# Patient Record
Sex: MALE | Race: WHITE | NOT HISPANIC OR LATINO | Employment: OTHER | URBAN - METROPOLITAN AREA
[De-identification: names, ages, dates, MRNs, and addresses within clinical notes are randomized per-mention and may not be internally consistent; named-entity substitution may affect disease eponyms.]

---

## 2017-01-04 ENCOUNTER — OFFICE VISIT (OUTPATIENT)
Dept: UROLOGY | Facility: CLINIC | Age: 67
End: 2017-01-04
Payer: MEDICARE

## 2017-01-04 VITALS
DIASTOLIC BLOOD PRESSURE: 80 MMHG | HEART RATE: 82 BPM | SYSTOLIC BLOOD PRESSURE: 133 MMHG | RESPIRATION RATE: 14 BRPM | TEMPERATURE: 97.6 F

## 2017-01-04 DIAGNOSIS — C64.2 RENAL CELL CARCINOMA, LEFT (H): Primary | ICD-10-CM

## 2017-01-04 PROCEDURE — 99024 POSTOP FOLLOW-UP VISIT: CPT | Performed by: UROLOGY

## 2017-01-04 NOTE — PROGRESS NOTES
REASON FOR VISIT TODAY:  Kidney cancer.      BRIEF COURSE:  Mr. Barrera Urena is a 66-year-old gentleman followed in our clinic for a large sided left renal mass.  The patient underwent an open radical nephrectomy on 12/19/2016.  The patient comes in today in followup.  Mr. Urena notes that all in all he is doing well while at home.  He has been trying to remain active despite the weather.  He notes that his bowels are working and he is having regular bowel movements and passing gas, though he notes his appetite is not back to normal.  The patient is doing nicotine patches in an attempt to quit smoking and has not smoked since surgery.      PHYSICAL EXAMINATION:  On exam today, his blood pressure is 133/80, pulse is 82.  He is in no acute distress.      The patient's subcostal wound is well healing with staples intact.  There is no discharge or erythema noted around the wound.  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      ASSESSMENT AND PLAN:  I suggested to Mr. Urena that we are pleased to see that all his margins were negative, but he did in fact have T3a disease so it is good we got the disease out when we did.  The patient does not require any further therapy at this time.  We would just put on a surveillance program.  We will plan on seeing the patient back in about 3-4 months for his first round of surveillance which would include a CMP and a CT of the chest and abdomen.  We will then determine how to move forward with surveillance at that time in terms of frequency.      Otherwise, the patient was counseled to protect his remaining kidney both in terms of minimizing risk of trauma as well as keeping his blood pressure under control as well as blood sugars and also to avoid nonsteroidal anti-inflammatory or other renal toxic  drugs.  He was also encouraged to continue with smoking cessation.  Mr. Stewart is in agreement with the plan.  We will see the patient back at the end of 2017 or early 2017 with his surveillance labs and imaging.         JOHN STREETER MD             D: 2017 10:52   T: 2017 12:58   MT:       Name:     ANETTE STEWART   MRN:      [id number]        Account:      [id number]   :      1950           Visit Date:   2017      Document: [id number]

## 2017-01-04 NOTE — NURSING NOTE
"Chief Complaint   Patient presents with     Surgical Followup     remove staples       Initial /80 mmHg  Pulse 82  Resp 14 Estimated body mass index is 25.68 kg/(m^2) as calculated from the following:    Height as of 12/19/16: 1.778 m (5' 10\").    Weight as of 12/13/16: 81.194 kg (179 lb).  BP completed using cuff size: regular    Alyssa Manrique MA      "

## 2017-01-23 ENCOUNTER — TELEPHONE (OUTPATIENT)
Dept: UROLOGY | Facility: CLINIC | Age: 67
End: 2017-01-23

## 2017-01-23 NOTE — TELEPHONE ENCOUNTER
Spoke to pt and he stated that since he had the surgery he has many days where he has no appetite and feels full and bloated. He stated that he has many days where he burps all day.  Offered pt appointment and appt was made for Wednesday.    Joaquina RITTER RN BSN PHN  [facility]

## 2017-01-23 NOTE — TELEPHONE ENCOUNTER
"Reason for call:  Patient reporting a symptom    Symptom or request: Pt calling -Dr. Downs's pt.  Kidney removed - states it was cancerous- wants to know when he might start feeling better.  Someday's he doesn't eat all day - no appetite.  Wondering if he should come in?    Duration (how long have symptoms been present): a month or so    Have you been treated for this before? No    Additional comments: \"there is days I don't care that I eat\"  Has lost 15 pounds since the surgery.    Phone Number patient can be reached at:  Home number on file [phone] (home)    Best Time:  any    Can we leave a detailed message on this number:  YES    Call taken on 1/23/2017 at 12:42 PM by Mandi Ewing    "

## 2017-01-25 ENCOUNTER — OFFICE VISIT (OUTPATIENT)
Dept: UROLOGY | Facility: CLINIC | Age: 67
End: 2017-01-25
Payer: MEDICARE

## 2017-01-25 VITALS — HEART RATE: 79 BPM | DIASTOLIC BLOOD PRESSURE: 82 MMHG | SYSTOLIC BLOOD PRESSURE: 123 MMHG | RESPIRATION RATE: 14 BRPM

## 2017-01-25 DIAGNOSIS — C64.2 KIDNEY MALIGNANCY, LEFT (H): Primary | ICD-10-CM

## 2017-01-25 PROCEDURE — 99024 POSTOP FOLLOW-UP VISIT: CPT | Performed by: UROLOGY

## 2017-01-25 NOTE — NURSING NOTE
"Chief Complaint   Patient presents with     RECHECK     since surgery on 12/19/16       Initial /82 mmHg  Pulse 79  Resp 14 Estimated body mass index is 25.68 kg/(m^2) as calculated from the following:    Height as of 12/19/16: 5' 10\" (1.778 m).    Weight as of 12/13/16: 179 lb (81.194 kg).  BP completed using cuff size: regular    Alyssa Manrique MA      "

## 2017-01-26 NOTE — PROGRESS NOTES
REASON FOR VISIT TODAY:  Kidney cancer.      BRIEF COURSE:  Mr. Barrera Urena is a 66-year-old gentleman followed in our clinic for a large left-sided renal mass.  He underwent an open radical nephrectomy on 12/19/16 that showed grade 3 clear cell renal cell carcinoma, stage pT3a N0 MX disease with negative margins.  The patient in general has been doing well but he comes in today noting that he continues to have some decreased appetite at home and some abdominal bloating.  He notes that since surgery he has lost 15 pounds again largely due to decreased appetite.  The patient notes that he continues to pass gas and have regular bowel movements but does note significant burping as well at times.  He notes that he occasionally gets sharp pains near his incision site but they only last for a second or two.      PHYSICAL EXAMINATION:   VITAL SIGNS:  The patient's blood pressure is 120/82, pulse 79, is in no acute distress.      ASSESSMENT AND PLAN:  Over half of today's 45-minute visit was spent counseling the patient regarding his recovery from his kidney cancer.  I counseled Mr. Urena that it is true that he seems to be coming along a little bit slower that we might otherwise expect.  The good news is that he does not sound like he has a mechanical bowel obstruction or anything of that nature given the fact he has continued to have bowel movements and passing gas.  We did discuss that it can take several weeks, even up to perhaps 3 months to completely recover from a big surgery like this.  We also note that we applaud the patient's efforts to stop smoking, which he is currently engaged in.  He is using nicotine patches for this purpose.  I did  the patient that I thought that his situation may be somewhat compounded by the fact that he is attempting to stop smoking at this time as well as this can also cause various changes to his body in addition to the healing process.  However, we do again encourage  the patient to continue to stop smoking.      With regard to the patient's weight loss.  I did suggest that I am concerned about this.  The patient has a big wound to heal and he needs plenty of nutrition in order to heal his wounds, but it is clear the patient is not getting adequate nutrition given his weight loss.  I therefore suggested the patient start consuming some sort of nutritional supplement such as a Boost or Ensure to try to drink 2-3 cans of this a day in addition to his meals.  In addition the patient was counseled to eat small amounts frequently through the course of the day instead of trying to eat larger meals just a couple of times per day.  I have asked the patient to call us back in 2 weeks to see how he is doing at that time.  We certainly hope the patient will start to do a little bit better at home.  He has started to walk a little bit more than he had been before so hopefully increase in activity will also help.  Mr. Stewart is in agreement with the plan.  We look forward to hearing back from him in 2 weeks.         JOHN STREETER MD             D: 2017 17:35   T: 2017 06:24   MT: [id number]      Name:     ANETTE STEWART   MRN:      [id number]        Account:      [id number]   :      1950           Visit Date:   2017      Document: [id number]

## 2017-02-08 ENCOUNTER — TELEPHONE (OUTPATIENT)
Dept: UROLOGY | Facility: CLINIC | Age: 67
End: 2017-02-08

## 2017-03-10 ENCOUNTER — DOCUMENTATION ONLY (OUTPATIENT)
Dept: OTHER | Facility: CLINIC | Age: 67
End: 2017-03-10

## 2017-03-10 DIAGNOSIS — Z71.89 ACP (ADVANCE CARE PLANNING): Chronic | ICD-10-CM

## 2017-04-18 RX ORDER — IOPAMIDOL 755 MG/ML
89 INJECTION, SOLUTION INTRAVASCULAR ONCE
Status: COMPLETED | OUTPATIENT
Start: 2017-04-19 | End: 2017-04-19

## 2017-04-19 ENCOUNTER — HOSPITAL ENCOUNTER (OUTPATIENT)
Dept: CT IMAGING | Facility: CLINIC | Age: 67
Discharge: HOME | End: 2017-04-19
Attending: UROLOGY | Admitting: UROLOGY
Payer: MEDICARE

## 2017-04-19 DIAGNOSIS — C64.2 RENAL CELL CARCINOMA, LEFT (H): ICD-10-CM

## 2017-04-19 LAB
ALBUMIN SERPL-MCNC: 3.9 G/DL (ref 3.4–5)
ALP SERPL-CCNC: 62 U/L (ref 40–150)
ALT SERPL W P-5'-P-CCNC: 23 U/L (ref 0–70)
ANION GAP SERPL CALCULATED.3IONS-SCNC: 7 MMOL/L (ref 3–14)
AST SERPL W P-5'-P-CCNC: 10 U/L (ref 0–45)
BILIRUB SERPL-MCNC: 0.6 MG/DL (ref 0.2–1.3)
BUN SERPL-MCNC: 25 MG/DL (ref 7–30)
CALCIUM SERPL-MCNC: 9.8 MG/DL (ref 8.5–10.1)
CHLORIDE SERPL-SCNC: 102 MMOL/L (ref 94–109)
CO2 SERPL-SCNC: 30 MMOL/L (ref 20–32)
CREAT SERPL-MCNC: 1.37 MG/DL (ref 0.66–1.25)
GFR SERPL CREATININE-BSD FRML MDRD: 52 ML/MIN/1.7M2
GLUCOSE SERPL-MCNC: 111 MG/DL (ref 70–99)
POTASSIUM SERPL-SCNC: 4.6 MMOL/L (ref 3.4–5.3)
PROT SERPL-MCNC: 7.7 G/DL (ref 6.8–8.8)
SODIUM SERPL-SCNC: 139 MMOL/L (ref 133–144)

## 2017-04-19 PROCEDURE — 74160 CT ABDOMEN W/CONTRAST: CPT

## 2017-04-19 PROCEDURE — 25500064 ZZH RX 255 OP 636: Performed by: RADIOLOGY

## 2017-04-19 PROCEDURE — 80053 COMPREHEN METABOLIC PANEL: CPT | Performed by: UROLOGY

## 2017-04-19 PROCEDURE — 36415 COLL VENOUS BLD VENIPUNCTURE: CPT | Performed by: UROLOGY

## 2017-04-19 PROCEDURE — 25000125 ZZHC RX 250: Performed by: RADIOLOGY

## 2017-04-19 RX ADMIN — SODIUM CHLORIDE 62 ML: 9 INJECTION, SOLUTION INTRAVENOUS at 10:14

## 2017-04-19 RX ADMIN — IOPAMIDOL 89 ML: 755 INJECTION, SOLUTION INTRAVENOUS at 10:14

## 2017-04-25 ENCOUNTER — TELEPHONE (OUTPATIENT)
Dept: UROLOGY | Facility: CLINIC | Age: 67
End: 2017-04-25

## 2017-04-25 NOTE — TELEPHONE ENCOUNTER
I spoke to Barrera today. He is a patient of Dr Downs who is S/P Nephrectomy on 16. He says that he is doing well.  He was having problems with weight loss. He says that this is no longer a problem and he has gained a lot of weight. He was scheduled for his visit with Dr Downs for 17 but he had to cancel due to his fathers . He would like us to review results with Dr Downs and see if he could or should be moved up in his schedule from 17 at 4:00 pm. Raine SHAVER RN

## 2017-04-25 NOTE — TELEPHONE ENCOUNTER
Reason for Call:  Other appointment    Detailed comments: pt calling stating he had to cancel his appt for tomorrow  due to a . He is now r/s for . Would like to know if he can be seen sooner     Phone Number Patient can be reached at: Home number on file [phone] (home)    Best Time: any    Can we leave a detailed message on this number? YES    Call taken on 2017 at 10:09 AM by Fiona Rooney

## 2017-05-24 ENCOUNTER — OFFICE VISIT (OUTPATIENT)
Dept: UROLOGY | Facility: CLINIC | Age: 67
End: 2017-05-24
Payer: MEDICARE

## 2017-05-24 VITALS
WEIGHT: 185 LBS | DIASTOLIC BLOOD PRESSURE: 75 MMHG | HEART RATE: 76 BPM | RESPIRATION RATE: 16 BRPM | BODY MASS INDEX: 26.48 KG/M2 | SYSTOLIC BLOOD PRESSURE: 129 MMHG | HEIGHT: 70 IN

## 2017-05-24 DIAGNOSIS — Z85.528 HISTORY OF KIDNEY CANCER: Primary | ICD-10-CM

## 2017-05-24 PROCEDURE — 99213 OFFICE O/P EST LOW 20 MIN: CPT | Performed by: UROLOGY

## 2017-05-24 NOTE — MR AVS SNAPSHOT
After Visit Summary   5/24/2017    Barrera Urena    MRN: [id number]           Patient Information     Date Of Birth          1950        Visit Information        Provider Department      5/24/2017 4:00 PM Tushar Downs MD [facility]        Today's Diagnoses     History of kidney cancer    -  1      Care Instructions    Per Physician's instructions            Follow-ups after your visit        Your next 10 appointments already scheduled     Jun 07, 2017  1:30 PM CDT   [facility]   [facility]    [address] [address]   [phone]            Nov 29, 2017 11:00 AM CST   Return Visit with Tushar Downs MD   [facility]    [address] [address]   [phone]              Future tests that were ordered for you today     Open Future Orders        Priority Expected Expires Ordered    CT Chest/Abdomen/Pelvis w Contrast Routine 11/24/2017 5/24/2018 5/24/2017    Comprehensive metabolic panel Routine 11/24/2017 5/24/2018 5/24/2017            Who to contact     If you have questions or need follow up information about today's clinic visit or your schedule please contact [facility] directly at [phone].  Normal or non-critical lab and imaging results will be communicated to you by MyChart, letter or phone within 4 business days after the clinic has received the results. If you do not hear from us within 7 days, please contact the clinic through MyChart or phone. If you have a critical or abnormal lab result, we will notify you by phone as soon as possible.  Submit refill requests through [employer] or call your pharmacy and they will forward the refill request to us. Please allow 3 business days for your refill to be completed.          Additional Information About Your Visit       "  [facility] Information     [employer] gives you secure access to your electronic health record. If you see a primary care provider, you can also send messages to your care team and make appointments. If you have questions, please call your primary care clinic.  If you do not have a primary care provider, please call [phone] and they will assist you.        Care EveryWhere ID     This is your Care EveryWhere ID. This could be used by other organizations to access your [place] medical records  [id number]        Your Vitals Were     Pulse Respirations Height BMI (Body Mass Index)          76 16 1.778 m (5' 10\") 26.54 kg/m2         Blood Pressure from Last 3 Encounters:   05/24/17 129/75   01/25/17 123/82   01/04/17 133/80    Weight from Last 3 Encounters:   05/24/17 83.9 kg (185 lb)   12/20/16 82.3 kg (181 lb 8 oz)   12/13/16 81.2 kg (179 lb)               Primary Care Provider Office Phone # Fax #    Guillermo Hicks [phone]       [facility] [address]  [facility] [address]        Thank you!     Thank you for choosing [facility]  for your care. Our goal is always to provide you with excellent care. Hearing back from our patients is one way we can continue to improve our services. Please take a few minutes to complete the written survey that you may receive in the mail after your visit with us. Thank you!             Your Updated Medication List - Protect others around you: Learn how to safely use, store and throw away your medicines at www.disposemymeds.org.          This list is accurate as of: 5/24/17  6:20 PM.  Always use your most recent med list.                   Brand Name Dispense Instructions for use    * TYLENOL 325 MG tablet   Generic drug:  acetaminophen      Take 325 mg by mouth as needed       * acetaminophen 325 MG tablet    TYLENOL    100 tablet    Take 2 tablets (650 mg) by mouth every 6 hours as needed for other (surgical pain)       * Notice:  This list has 2 " medication(s) that are the same as other medications prescribed for you. Read the directions carefully, and ask your doctor or other care provider to review them with you.

## 2017-05-24 NOTE — NURSING NOTE
"Chief Complaint   Patient presents with     RECHECK     Kidney malignancy        Initial /75 (BP Location: Left arm, Patient Position: Chair, Cuff Size: Adult Regular)  Pulse 76  Resp 16  Ht 1.778 m (5' 10\")  Wt 83.9 kg (185 lb)  BMI 26.54 kg/m2 Estimated body mass index is 26.54 kg/(m^2) as calculated from the following:    Height as of this encounter: 1.778 m (5' 10\").    Weight as of this encounter: 83.9 kg (185 lb).  BP completed using cuff size: regular      Clare Pemberton CMA     "

## 2017-05-25 NOTE — PROGRESS NOTES
REASON FOR VISIT TODAY:  History of kidney cancer.      BRIEF COURSE:  Mr. Barrera Stewart is a 67-year-old gentleman followed in our clinic for history of a large left-sided renal mass.  He underwent an open radical nephrectomy on 16 that showed Gill grade 3 clear cell renal cell carcinoma, pathologic stage pT3a N0 MX disease with negative margins.  He has done well following surgery, though he did have a 15-pound weight loss shortly after surgery, but we asked the patient to start taking Boost and Ensure after that which he did and he notes that he has not regained the 15 pounds that he has lost, he otherwise feels well.      PHYSICAL EXAMINATION:   VITAL:  On exam his blood pressure is 129/75, pulse 76.   GENERAL:  He is in no acute distress.      DATA:  The patient has a CMP from 2017 showing a creatinine of 1.37 and normal LFTs.  He has a CT of the chest, abdomen and pelvis from 2017 showing a 4 mm indeterminate nodule in the right upper lobe of his lung without any other evidence of recurrence.      ASSESSMENT AND PLAN:  Over half of today's 15-minute visit was spent counseling the patient regarding his history of kidney cancer.  I suggested to Mr. Stewart that the small indeterminate nodule in his lung will need to be followed, but at this point is not overly concerning as we commonly see indeterminate nodules show up on CT scans of the chest.  We will plan on seeing the patient back in 6 months with another complete metabolic panel as well as a repeat CT of the chest, abdomen and pelvis for further followup of both lung nodule and cystoscopy surveillance for his kidney cancer.  The patient is otherwise in agreement with the plan and we will see him back in 6 months' time.         JOHN STREETER MD             D: 2017 18:19   T: 2017 06:51   MT: [id number]      Name:     BARRERA STEWART   MRN:      [id number]        Account:      [id number]   :      1950            Visit Date:   05/24/2017      Document: [id number]

## 2017-10-27 RX ORDER — IOPAMIDOL 755 MG/ML
90 INJECTION, SOLUTION INTRAVASCULAR ONCE
Status: COMPLETED | OUTPATIENT
Start: 2017-10-30 | End: 2017-10-30

## 2017-10-30 ENCOUNTER — HOSPITAL ENCOUNTER (OUTPATIENT)
Dept: CT IMAGING | Facility: CLINIC | Age: 67
Discharge: HOME | End: 2017-10-30
Attending: UROLOGY | Admitting: UROLOGY
Payer: MEDICARE

## 2017-10-30 DIAGNOSIS — Z85.528 HISTORY OF KIDNEY CANCER: ICD-10-CM

## 2017-10-30 LAB
ALBUMIN SERPL-MCNC: 3.9 G/DL (ref 3.4–5)
ALP SERPL-CCNC: 53 U/L (ref 40–150)
ALT SERPL W P-5'-P-CCNC: 21 U/L (ref 0–70)
ANION GAP SERPL CALCULATED.3IONS-SCNC: 4 MMOL/L (ref 3–14)
AST SERPL W P-5'-P-CCNC: 13 U/L (ref 0–45)
BILIRUB SERPL-MCNC: 0.6 MG/DL (ref 0.2–1.3)
BUN SERPL-MCNC: 20 MG/DL (ref 7–30)
CALCIUM SERPL-MCNC: 9.3 MG/DL (ref 8.5–10.1)
CHLORIDE SERPL-SCNC: 103 MMOL/L (ref 94–109)
CO2 SERPL-SCNC: 29 MMOL/L (ref 20–32)
CREAT SERPL-MCNC: 1.31 MG/DL (ref 0.66–1.25)
GFR SERPL CREATININE-BSD FRML MDRD: 54 ML/MIN/1.7M2
GLUCOSE SERPL-MCNC: 110 MG/DL (ref 70–99)
POTASSIUM SERPL-SCNC: 4.8 MMOL/L (ref 3.4–5.3)
PROT SERPL-MCNC: 7.8 G/DL (ref 6.8–8.8)
SODIUM SERPL-SCNC: 136 MMOL/L (ref 133–144)

## 2017-10-30 PROCEDURE — 36415 COLL VENOUS BLD VENIPUNCTURE: CPT | Performed by: UROLOGY

## 2017-10-30 PROCEDURE — 71260 CT THORAX DX C+: CPT

## 2017-10-30 PROCEDURE — 25000128 H RX IP 250 OP 636: Performed by: RADIOLOGY

## 2017-10-30 PROCEDURE — 80053 COMPREHEN METABOLIC PANEL: CPT | Performed by: UROLOGY

## 2017-10-30 PROCEDURE — 74177 CT ABD & PELVIS W/CONTRAST: CPT

## 2017-10-30 PROCEDURE — 25000125 ZZHC RX 250: Performed by: RADIOLOGY

## 2017-10-30 RX ADMIN — IOPAMIDOL 90 ML: 755 INJECTION, SOLUTION INTRAVENOUS at 10:58

## 2017-10-30 RX ADMIN — SODIUM CHLORIDE 62 ML: 9 INJECTION, SOLUTION INTRAVENOUS at 10:58

## 2017-11-02 ENCOUNTER — TELEPHONE (OUTPATIENT)
Dept: UROLOGY | Facility: CLINIC | Age: 67
End: 2017-11-02

## 2017-11-02 NOTE — TELEPHONE ENCOUNTER
Spoke to Asha and advised that these concerns can be addressed at [facility] appt that is schedule with Dr. Downs on 11-29-17.  She agrees with this plan.    Kianna Romano  [facility] RN

## 2017-11-02 NOTE — TELEPHONE ENCOUNTER
Reason for Call:  Other     Detailed comments: Wife, Asha, calling (consent to communicate on file): Pt is bothered by hives since May. He has seen an allergist through [facility] - lab work was supposed to be faxed here from them yesterday. Vitamin D level low - Bottle states not to take vitamin D if pt has kidney disease - They want to know if he is okay to take the Vitamin D with kidney disease and if so, what dose. Allergist believes the hives are related to low vitamin D levels. - Please advise    Phone Number Patient can be reached at: Home number on file [phone] (home)    Best Time: Any    Can we leave a detailed message on this number? YES    Call taken on 11/2/2017 at 9:27 AM by Denise Behrendt

## 2017-11-20 ENCOUNTER — TRANSFERRED RECORDS (OUTPATIENT)
Dept: HEALTH INFORMATION MANAGEMENT | Facility: CLINIC | Age: 67
End: 2017-11-20

## 2017-11-29 ENCOUNTER — OFFICE VISIT (OUTPATIENT)
Dept: UROLOGY | Facility: CLINIC | Age: 67
End: 2017-11-29
Payer: MEDICARE

## 2017-11-29 VITALS — SYSTOLIC BLOOD PRESSURE: 136 MMHG | HEART RATE: 70 BPM | DIASTOLIC BLOOD PRESSURE: 89 MMHG | RESPIRATION RATE: 16 BRPM

## 2017-11-29 DIAGNOSIS — C64.2 MALIGNANT NEOPLASM OF KIDNEY EXCLUDING RENAL PELVIS, LEFT (H): Primary | ICD-10-CM

## 2017-11-29 PROCEDURE — 99213 OFFICE O/P EST LOW 20 MIN: CPT | Performed by: UROLOGY

## 2017-11-29 NOTE — MR AVS SNAPSHOT
After Visit Summary   11/29/2017    Barrera Urena    MRN: [id number]           Patient Information     Date Of Birth          1950        Visit Information        Provider Department      11/29/2017 11:00 AM Tushar Downs MD [facility]        Today's Diagnoses     Malignant neoplasm of kidney excluding renal pelvis, left (H)    -  1       Follow-ups after your visit        Your next 10 appointments already scheduled     May 14, 2018 10:05 AM CDT   LAB with [facility] ([facility])    [address] [address]   [phone]           Please do not eat 10-12 hours before your appointment if you are coming in fasting for labs on lipids, cholesterol, or glucose (sugar). This does not apply to pregnant women. Water, hot tea and black coffee (with nothing added) are okay. Do not drink other fluids, diet soda or chew gum.            May 23, 2018 11:00 AM CDT   Return Visit with Tushar Downs MD   [facility]    [address] [address]   [phone]              Future tests that were ordered for you today     Open Future Orders        Priority Expected Expires Ordered    Comprehensive metabolic panel Routine 5/29/2018 11/29/2018 11/29/2017    CT Chest/Abdomen/Pelvis w Contrast Routine 5/29/2018 11/29/2018 11/29/2017            Who to contact     If you have questions or need follow up information about today's clinic visit or your schedule please contact [facility] directly at [phone].  Normal or non-critical lab and imaging results will be communicated to you by MyChart, letter or phone within 4 business days after the clinic has received the results. If you do not hear from us within 7 days, please contact the clinic through MyChart or phone. If you have a critical or abnormal lab result, we will notify you by phone as  soon as possible.  Submit refill requests through [employer] or call your pharmacy and they will forward the refill request to us. Please allow 3 business days for your refill to be completed.          Additional Information About Your Visit        Concurrent Inchart Information     [employer] gives you secure access to your electronic health record. If you see a primary care provider, you can also send messages to your care team and make appointments. If you have questions, please call your primary care clinic.  If you do not have a primary care provider, please call [phone] and they will assist you.        Care EveryWhere ID     This is your Care EveryWhere ID. This could be used by other organizations to access your [place] medical records  [id number]        Your Vitals Were     Pulse Respirations                70 16           Blood Pressure from Last 3 Encounters:   11/29/17 136/89   05/24/17 129/75   01/25/17 123/82    Weight from Last 3 Encounters:   05/24/17 83.9 kg (185 lb)   12/20/16 82.3 kg (181 lb 8 oz)   12/13/16 81.2 kg (179 lb)               Primary Care Provider Office Phone # Fax #    Guillermo Hicks [phone]       [facility] [place] [address]  [place] [address]        Equal Access to Services     BASIL SIU : Hadii aad ku hadasho Soomaali, waaxda luqadaha, qaybta kaalmada adeegyada, meenu jennings haygaryn deedee fernandez. So [facility] [phone].    ATENCIÓN: Si habla español, tiene a musa disposición servicios gratuitos de asistencia lingüística. Llame al [phone].    We comply with applicable federal civil rights laws and [place] laws. We do not discriminate on the basis of race, color, national origin, age, disability, sex, sexual orientation, or gender identity.            Thank you!     Thank you for choosing [facility]  for your care. Our goal is always to provide you with excellent care. Hearing back from our patients is one way we can continue to improve our  services. Please take a few minutes to complete the written survey that you may receive in the mail after your visit with us. Thank you!             Your Updated Medication List - Protect others around you: Learn how to safely use, store and throw away your medicines at www.disposemymeds.org.          This list is accurate as of: 11/29/17 12:44 PM.  Always use your most recent med list.                   Brand Name Dispense Instructions for use Diagnosis    * TYLENOL 325 MG tablet   Generic drug:  acetaminophen      Take 325 mg by mouth as needed        * acetaminophen 325 MG tablet    TYLENOL    100 tablet    Take 2 tablets (650 mg) by mouth every 6 hours as needed for other (surgical pain)    Renal mass, left       * Notice:  This list has 2 medication(s) that are the same as other medications prescribed for you. Read the directions carefully, and ask your doctor or other care provider to review them with you.

## 2017-11-29 NOTE — NURSING NOTE
"Chief Complaint   Patient presents with     RECHECK       Initial /89 (BP Location: Left arm, Patient Position: Chair, Cuff Size: Adult Regular)  Pulse 70  Resp 16 Estimated body mass index is 26.54 kg/(m^2) as calculated from the following:    Height as of 5/24/17: 1.778 m (5' 10\").    Weight as of 5/24/17: 83.9 kg (185 lb).  Medication Reconciliation: complete.  marisa soares LPN      "

## 2017-11-30 NOTE — PROGRESS NOTES
REASON FOR VISIT TODAY:  History of kidney cancer.      BRIEF COURSE:  Mr. Barrera Urena is a 67-year-old gentleman followed in our clinic for a history of left-sided renal cell carcinoma.  The patient underwent an open radical nephrectomy on 12/19/2016 that showed Gill grade III clear cell renal cell carcinoma with a pathologic stage of [id number] disease with negative margins.  He has had no evidence of recurrence to date.  The patient did have one 4 mm nonspecific pulmonary nodule which is being followed.  Also, following surgery the patient had a 15-pound weight loss due to decreased appetite and has been taking Boost shakes at least 1 a day on average.  He notes that his weight has gone up and in fact he now weighs more than he did preoperatively.  Also of note, the patient has stopped smoking.      PHYSICAL EXAMINATION:   VITAL SIGNS:  On exam today, his blood pressure is 136/89, pulse is 70.   GENERAL:  He is in no acute distress.      The patient's complete metabolic panel from 10/30/2017 shows a creatinine of 1.3, a glucose mildly elevated at 110, and normal LFTs.  He has a CT of the chest, abdomen and pelvis from 10/30/2017 that showed stability of the 4 mm noncalcified nodule in the right upper lobe of his lung that is stable compared to previous imaging.  There is no other evidence of metastatic disease.      ASSESSMENT AND PLAN:  Over half of today's 15-minute visit was spent counseling the patient regarding his kidney cancer.  I suggested to Mr. Urena that we were pleased to see that there is no evidence of metastatic disease and that in particular the lung nodule is stable.  We will simply plan on seeing the patient back in another 6 months with another CMP and another CT of the chest, abdomen and pelvis.  I did  the patient that now that his weight has now superseded his preoperative weight, he can probably back off on the Boost and just eat normally at this point.  Further, we  congratulated the patient on stopping his smoking.  Lastly, the patient's glucose was mildly elevated on a couple of measurements since his surgery, and we have suggested the patient follow up with his primary doctor for further discussions of this.         JOHN STREETER MD             D: 2017 12:44   T: 2017 19:07   MT: [place]      Name:     ANETTE STEWART   MRN:      [id number]        Account:      [id number]   :      1950           Visit Date:   2017      Document: [id number]

## 2017-12-01 ENCOUNTER — MEDICAL CORRESPONDENCE (OUTPATIENT)
Dept: HEALTH INFORMATION MANAGEMENT | Facility: CLINIC | Age: 67
End: 2017-12-01

## 2017-12-19 ENCOUNTER — TELEPHONE (OUTPATIENT)
Dept: ALLERGY | Facility: CLINIC | Age: 67
End: 2017-12-19

## 2017-12-19 NOTE — TELEPHONE ENCOUNTER
Reason for Call:  Other     Detailed comments: [facility] calling to make sure orders for Xolair received. Pt would like to get appt set up to start shots.     Phone Number Patient can be reached at: Please call Steven at [facility] [phone]    Best Time: Any    Can we leave a detailed message on this number? YES    Call taken on 12/19/2017 at 8:59 AM by Denise Behrendt

## 2017-12-19 NOTE — TELEPHONE ENCOUNTER
Spoke to Steven at [facility] to let her know as of Dec 1, 2017 [facility] will no longer be providing Xolair injections to outside patients. Steven was given [facility] number of [phone] and will contact them.    Deya Randall RN

## 2018-01-02 NOTE — TELEPHONE ENCOUNTER
Pt calling regarding Xolair injections. Informed pt of of note below and gave him contact information for [facility] to discuss his options. Informed him that we do not give Xolair injections for outside allergist. Agreeable.    Irasema DENTON RN

## 2018-01-16 PROBLEM — L50.1 IDIOPATHIC URTICARIA: Status: ACTIVE | Noted: 2018-01-16

## 2018-01-24 ENCOUNTER — INFUSION THERAPY VISIT (OUTPATIENT)
Dept: INFUSION THERAPY | Facility: CLINIC | Age: 68
End: 2018-01-24
Attending: ALLERGY & IMMUNOLOGY
Payer: MEDICARE

## 2018-01-24 VITALS — TEMPERATURE: 96.5 F | DIASTOLIC BLOOD PRESSURE: 83 MMHG | HEART RATE: 72 BPM | SYSTOLIC BLOOD PRESSURE: 138 MMHG

## 2018-01-24 DIAGNOSIS — L50.1 IDIOPATHIC URTICARIA: Primary | ICD-10-CM

## 2018-01-24 PROCEDURE — 25000128 H RX IP 250 OP 636: Performed by: ALLERGY & IMMUNOLOGY

## 2018-01-24 PROCEDURE — 96372 THER/PROPH/DIAG INJ SC/IM: CPT

## 2018-01-24 RX ADMIN — OMALIZUMAB 300 MG: 202.5 INJECTION, SOLUTION SUBCUTANEOUS at 11:50

## 2018-01-24 NOTE — MR AVS SNAPSHOT
After Visit Summary   1/24/2018    Barrera Urena    MRN: [id number]           Patient Information     Date Of Birth          1950        Visit Information        Provider Department      1/24/2018 11:00 AM ROOM 2 [facility]        Today's Diagnoses     Idiopathic urticaria    -  1       Follow-ups after your visit        Your next 10 appointments already scheduled     Feb 21, 2018 11:30 AM CST   Level 4 with ROOM 3 [facility] ([facility])    [facility]  [address]  [facility] [address]   [phone]            May 14, 2018 10:05 AM CDT   LAB with [facility] ([facility])    [address] [facility] [address]   [phone]           Please do not eat 10-12 hours before your appointment if you are coming in fasting for labs on lipids, cholesterol, or glucose (sugar). This does not apply to pregnant women. Water, hot tea and black coffee (with nothing added) are okay. Do not drink other fluids, diet soda or chew gum.            May 14, 2018 10:30 AM CDT   (Arrive by 10:15 AM)   CT CHEST/ABDOMEN/PELVIS W CONTRAST with WYCT1   [facility] CT ([facility])    [address] [facility] [address]   [phone]           Please bring any scans or X-rays taken at other hospitals, if similar tests were done. Also bring a list of your medicines, including vitamins, minerals and over-the-counter drugs. It is safest to leave personal items at home.  Be sure to tell your doctor:   If you have any allergies.   If there s any chance you are pregnant.   If you are breastfeeding.   If you have any special needs.  You may have contrast for this exam. To prepare:   Do not eat or drink for 2 hours before your exam. If you need to take medicine, you may take it with small sips of water. (We may ask you to take liquid medicine as well.)   The day before your  exam, drink extra fluids at least six 8-ounce glasses (unless your doctor tells you to restrict your fluids).  Patients over 70 or patients with diabetes or kidney problems:   If you haven t had a blood test (creatinine test) within the last 30 days, go to your clinic or Diagnostic Imaging Department for this test.  If you have diabetes:   If your kidney function is normal, continue taking your metformin (Avandamet, Glucophage, Glucovance, Metaglip) on the day of your exam.   If your kidney function is abnormal, wait 48 hours before restarting this medicine.  You will have oral contrast for this exam:   You will drink the contrast at home. Get this from your clinic or Diagnostic Imaging Department. Please follow the directions given.  Please wear loose clothing, such as a sweat suit or jogging clothes. Avoid snaps, zippers and other metal. We may ask you to undress and put on a hospital gown.  If you have any questions, please call the Imaging Department where you will have your exam.            May 23, 2018 11:00 AM CDT   Return Visit with Tushar Downs MD   [facility] ([facility]    [address] [facility] [address] [phone]              Who to contact     If you have questions or need follow up information about today's clinic visit or your schedule please contact [facility] directly at [phone].  Normal or non-critical lab and imaging results will be communicated to you by MyChart, letter or phone within 4 business days after the clinic has received the results. If you do not hear from us within 7 days, please contact the clinic through MyChart or phone. If you have a critical or abnormal lab result, we will notify you by phone as soon as possible.  Submit refill requests through [email or web address] or call your pharmacy and they will forward the refill request to us. Please allow 3 business days for your refill to be completed.          Additional  Information About Your Visit        [employer] Information     [employer] gives you secure access to your electronic health record. If you see a primary care provider, you can also send messages to your care team and make appointments. If you have questions, please call your primary care clinic.  If you do not have a primary care provider, please call [phone] and they will assist you.        Care EveryWhere ID     This is your Care EveryWhere ID. This could be used by other organizations to access your [place] medical records  [id number]        Your Vitals Were     Pulse Temperature                72 96.5  F (35.8  C) (Oral)           Blood Pressure from Last 3 Encounters:   01/24/18 138/83   11/29/17 136/89   05/24/17 129/75    Weight from Last 3 Encounters:   05/24/17 83.9 kg (185 lb)   12/20/16 82.3 kg (181 lb 8 oz)   12/13/16 81.2 kg (179 lb)              Today, you had the following     No orders found for display       Primary Care Provider Office Phone # Fax #    Guillermo Hicks [phone]       [facility] [place] [address]  [place] [address]        Equal Access to Services     [facility]: Hadii aad ku hadasho Soomaali, waaxda luqadaha, qaybta kaalmada adeegyada, meenu jennings haygaryn adejose angel krishnamurthy . So [facility] [phone].    ATENCIÓN: Si habla español, tiene a musa disposición servicios gratuitos de asistencia lingüística. [facility] [phone].    We comply with applicable federal civil rights laws and [place] laws. We do not discriminate on the basis of race, color, national origin, age, disability, sex, sexual orientation, or gender identity.            Thank you!     Thank you for choosing [facility]  for your care. Our goal is always to provide you with excellent care. Hearing back from our patients is one way we can continue to improve our services. Please take a few minutes to complete the written survey that you may receive in the mail after your visit with us.  Thank you!             Your Updated Medication List - Protect others around you: Learn how to safely use, store and throw away your medicines at www.disposemymeds.org.          This list is accurate as of 1/24/18  3:44 PM.  Always use your most recent med list.                   Brand Name Dispense Instructions for use Diagnosis    * TYLENOL 325 MG tablet   Generic drug:  acetaminophen      Take 325 mg by mouth as needed        * acetaminophen 325 MG tablet    TYLENOL    100 tablet    Take 2 tablets (650 mg) by mouth every 6 hours as needed for other (surgical pain)    Renal mass, left       * Notice:  This list has 2 medication(s) that are the same as other medications prescribed for you. Read the directions carefully, and ask your doctor or other care provider to review them with you.

## 2018-01-24 NOTE — PROGRESS NOTES
Infusion Nursing Note:  Barrera Urena presents today for 1st Xolair injection.    Patient seen by provider today: No   present during visit today: Not Applicable.    Note: N/A.    Intravenous Access:  No Intravenous access/labs at this visit.    Treatment Conditions:  Not Applicable.      Post Infusion Assessment:  Patient tolerated injection without incident.  Observation of 3 hours completed.  Pt had no complaints of adverse reaction symptoms.    Discharge Plan:   Patient discharged in stable condition accompanied by: wife.  Departure Mode: Ambulatory.  Next injection is scheduled for 2/21.    Maegan Oliiva RN

## 2018-02-21 ENCOUNTER — INFUSION THERAPY VISIT (OUTPATIENT)
Dept: INFUSION THERAPY | Facility: CLINIC | Age: 68
End: 2018-02-21
Attending: ALLERGY & IMMUNOLOGY
Payer: MEDICARE

## 2018-02-21 VITALS
RESPIRATION RATE: 16 BRPM | HEART RATE: 86 BPM | SYSTOLIC BLOOD PRESSURE: 126 MMHG | TEMPERATURE: 97.7 F | DIASTOLIC BLOOD PRESSURE: 77 MMHG

## 2018-02-21 DIAGNOSIS — L50.1 IDIOPATHIC URTICARIA: Primary | ICD-10-CM

## 2018-02-21 PROCEDURE — 25000128 H RX IP 250 OP 636: Performed by: ALLERGY & IMMUNOLOGY

## 2018-02-21 PROCEDURE — 96372 THER/PROPH/DIAG INJ SC/IM: CPT

## 2018-02-21 RX ADMIN — OMALIZUMAB 300 MG: 202.5 INJECTION, SOLUTION SUBCUTANEOUS at 12:23

## 2018-02-21 NOTE — MR AVS SNAPSHOT
After Visit Summary   2/21/2018    Barrera Urena    MRN: [id number]           Patient Information     Date Of Birth          1950        Visit Information        Provider Department      2/21/2018 11:30 AM ROOM 3 [facility]        Today's Diagnoses     Idiopathic urticaria    -  1       Follow-ups after your visit        Your next 10 appointments already scheduled     Mar 21, 2018 11:30 AM CDT   Level 3 with ROOM 2 [facility] ([facility])    [facility]  [address]  [facility] [address]   [phone]            May 14, 2018 10:05 AM CDT   LAB with [facility] ([facility])    [address] [facility] [address]   [phone]           Please do not eat 10-12 hours before your appointment if you are coming in fasting for labs on lipids, cholesterol, or glucose (sugar). This does not apply to pregnant women. Water, hot tea and black coffee (with nothing added) are okay. Do not drink other fluids, diet soda or chew gum.            May 14, 2018 10:30 AM CDT   (Arrive by 10:15 AM)   CT CHEST/ABDOMEN/PELVIS W CONTRAST with WYCT1   [facility] CT ([facility])    [address] [facility] [address]   [phone]           Please bring any scans or X-rays taken at other hospitals, if similar tests were done. Also bring a list of your medicines, including vitamins, minerals and over-the-counter drugs. It is safest to leave personal items at home.  Be sure to tell your doctor:   If you have any allergies.   If there s any chance you are pregnant.   If you are breastfeeding.  You may have contrast for this exam. To prepare:   Do not eat or drink for 2 hours before your exam. If you need to take medicine, you may take it with small sips of water. (We may ask you to take liquid medicine as well.)   The day before your exam, drink extra fluids at least  six 8-ounce glasses (unless your doctor tells you to restrict your fluids).   You will be given instructions on how to drink the contrast.  Patients over 70 or patients with diabetes or kidney problems:   If you haven t had a blood test (creatinine test) within the last 30 days, the Cardiologist/Radiologist may require you to get this test prior to your exam.  If you have diabetes:   Continue to take your metformin medication on the day of your exam  Please wear loose clothing, such as a sweat suit or jogging clothes. Avoid snaps, zippers and other metal. We may ask you to undress and put on a hospital gown.  If you have any questions, please call the Imaging Department where you will have your exam.            May 23, 2018 11:00 AM CDT   Return Visit with Tushar Downs MD   [facility]    [address] [facility] [place] [address] [phone]              Who to contact     If you have questions or need follow up information about today's clinic visit or your schedule please contact [facility] directly at [phone].  Normal or non-critical lab and imaging results will be communicated to you by MyChart, letter or phone within 4 business days after the clinic has received the results. If you do not hear from us within 7 days, please contact the clinic through [employer] or phone. If you have a critical or abnormal lab result, we will notify you by phone as soon as possible.  Submit refill requests through [employer] or call your pharmacy and they will forward the refill request to us. Please allow 3 business days for your refill to be completed.          Additional Information About Your Visit        [employer] Information     [employer] gives you secure access to your electronic health record. If you see a primary care provider, you can also send messages to your care team and make appointments. If you have questions, please call your primary care clinic.   If you do not have a primary care provider, please call [phone] and they will assist you.        Care EveryWhere ID     This is your Care EveryWhere ID. This could be used by other organizations to access your [place] medical records  [id number]        Your Vitals Were     Pulse Temperature Respirations             86 97.7  F (36.5  C) 16          Blood Pressure from Last 3 Encounters:   02/21/18 126/77   01/24/18 138/83   11/29/17 136/89    Weight from Last 3 Encounters:   05/24/17 83.9 kg (185 lb)   12/20/16 82.3 kg (181 lb 8 oz)   12/13/16 81.2 kg (179 lb)              Today, you had the following     No orders found for display       Primary Care Provider Office Phone # Fax #    Guillermo Hicks [phone]       [facility] [place] [address]  [place] [address]        Equal Access to Services     [facility] : Hadii aad ku hadasho Soomaali, waaxda luqadaha, qaybta kaalmada adeegyada, waxay daniellein haygaryn deedee krishnamurthy . So [facility] [phone].    ATENCIÓN: Si habla español, tiene a musa disposición servicios gratuitos de asistencia lingüística. Marnie al [phone].    We comply with applicable federal civil rights laws and [place] laws. We do not discriminate on the basis of race, color, national origin, age, disability, sex, sexual orientation, or gender identity.            Thank you!     Thank you for choosing [facility]  for your care. Our goal is always to provide you with excellent care. Hearing back from our patients is one way we can continue to improve our services. Please take a few minutes to complete the written survey that you may receive in the mail after your visit with us. Thank you!             Your Updated Medication List - Protect others around you: Learn how to safely use, store and throw away your medicines at www.disposemymeds.org.          This list is accurate as of 2/21/18  1:10 PM.  Always use your most recent med list.                   Brand  Name Dispense Instructions for use Diagnosis    * TYLENOL 325 MG tablet   Generic drug:  acetaminophen      Take 325 mg by mouth as needed        * acetaminophen 325 MG tablet    TYLENOL    100 tablet    Take 2 tablets (650 mg) by mouth every 6 hours as needed for other (surgical pain)    Renal mass, left       * Notice:  This list has 2 medication(s) that are the same as other medications prescribed for you. Read the directions carefully, and ask your doctor or other care provider to review them with you.

## 2018-02-21 NOTE — PROGRESS NOTES
Infusion Nursing Note:  Barrera Urena presents today for Xolair.    Patient seen by provider today: No   present during visit today: Not Applicable.    Note: N/A.    Intravenous Access:  NA    Treatment Conditions:  Not Applicable.      Post Infusion Assessment:  Patient tolerated injection without incident. Observed for 30min post injections.    Discharge Plan:   Patient discharged in stable condition accompanied by: wife.  Returns in1mo.    Priyank Alexander RN

## 2018-03-21 ENCOUNTER — INFUSION THERAPY VISIT (OUTPATIENT)
Dept: INFUSION THERAPY | Facility: CLINIC | Age: 68
End: 2018-03-21
Attending: ALLERGY & IMMUNOLOGY
Payer: MEDICARE

## 2018-03-21 VITALS — DIASTOLIC BLOOD PRESSURE: 85 MMHG | TEMPERATURE: 97 F | SYSTOLIC BLOOD PRESSURE: 133 MMHG | HEART RATE: 82 BPM

## 2018-03-21 DIAGNOSIS — L50.1 IDIOPATHIC URTICARIA: Primary | ICD-10-CM

## 2018-03-21 PROCEDURE — 25000128 H RX IP 250 OP 636: Performed by: ALLERGY & IMMUNOLOGY

## 2018-03-21 PROCEDURE — 96372 THER/PROPH/DIAG INJ SC/IM: CPT

## 2018-03-21 RX ADMIN — OMALIZUMAB 300 MG: 202.5 INJECTION, SOLUTION SUBCUTANEOUS at 12:41

## 2018-03-21 NOTE — PROGRESS NOTES
Infusion Nursing Note:  Barrera Urena presents today for Xolair.    Patient seen by provider today: No   present during visit today: Not Applicable.    Note: N/A.    Intravenous Access:  No Intravenous access/labs at this visit.    Treatment Conditions:  Per therapy plan.      Post Infusion Assessment:  Patient tolerated injections without incident.  Patient observed for 2 hours post injections per protocol.  This was pt's third dose. Next time his observation time will be 30 minutes      Discharge Plan:   Patient discharged in stable condition accompanied by: wife.  Departure Mode: Ambulatory.    Maira Carey RN

## 2018-03-21 NOTE — MR AVS SNAPSHOT
After Visit Summary   3/21/2018    Barrera Urena    MRN: [id number]           Patient Information     Date Of Birth          1950        Visit Information        Provider Department      3/21/2018 11:30 AM ROOM 2 [facility]        Today's Diagnoses     Idiopathic urticaria    -  1       Follow-ups after your visit        Your next 10 appointments already scheduled     Apr 18, 2018 11:30 AM CDT   Level 2 with ROOM 2 [facility] ([facility])    [facility]  [address]  [facility] [address]   [phone]            May 14, 2018 10:05 AM CDT   LAB with [facility] ([facility])    [address] [facility] [address]   [phone]           Please do not eat 10-12 hours before your appointment if you are coming in fasting for labs on lipids, cholesterol, or glucose (sugar). This does not apply to pregnant women. Water, hot tea and black coffee (with nothing added) are okay. Do not drink other fluids, diet soda or chew gum.            May 14, 2018 10:30 AM CDT   (Arrive by 10:15 AM)   CT CHEST/ABDOMEN/PELVIS W CONTRAST with WYCT1   [facility] CT ([facility])    [address] [address]   [phone]           Please bring any scans or X-rays taken at other hospitals, if similar tests were done. Also bring a list of your medicines, including vitamins, minerals and over-the-counter drugs. It is safest to leave personal items at home.  Be sure to tell your doctor:   If you have any allergies.   If there s any chance you are pregnant.   If you are breastfeeding.  You may have contrast for this exam. To prepare:   Do not eat or drink for 2 hours before your exam. If you need to take medicine, you may take it with small sips of water. (We may ask you to take liquid medicine as well.)   The day before your exam, drink extra fluids at least  six 8-ounce glasses (unless your doctor tells you to restrict your fluids).   You will be given instructions on how to drink the contrast.  Patients over 70 or patients with diabetes or kidney problems:   If you haven t had a blood test (creatinine test) within the last 30 days, the Cardiologist/Radiologist may require you to get this test prior to your exam.  If you have diabetes:   Continue to take your metformin medication on the day of your exam  Please wear loose clothing, such as a sweat suit or jogging clothes. Avoid snaps, zippers and other metal. We may ask you to undress and put on a hospital gown.  If you have any questions, please call the Imaging Department where you will have your exam.            May 23, 2018 11:00 AM CDT   Return Visit with Tushar Downs MD   [facility]    [address] [address] [phone]              Who to contact     If you have questions or need follow up information about today's clinic visit or your schedule please contact [facility] directly at [phone].  Normal or non-critical lab and imaging results will be communicated to you by MyChart, letter or phone within 4 business days after the clinic has received the results. If you do not hear from us within 7 days, please contact the clinic through [employer] or phone. If you have a critical or abnormal lab result, we will notify you by phone as soon as possible.  Submit refill requests through [employer] or call your pharmacy and they will forward the refill request to us. Please allow 3 business days for your refill to be completed.          Additional Information About Your Visit        [employer] Information     [employer] gives you secure access to your electronic health record. If you see a primary care provider, you can also send messages to your care team and make appointments. If you have questions, please call your primary care clinic.   If you do not have a primary care provider, please call [phone] and they will assist you.        Care EveryWhere ID     This is your Care EveryWhere ID. This could be used by other organizations to access your [place] medical records  [id number]        Your Vitals Were     Pulse Temperature                82 97  F (36.1  C) (Oral)           Blood Pressure from Last 3 Encounters:   03/21/18 133/85   02/21/18 126/77   01/24/18 138/83    Weight from Last 3 Encounters:   05/24/17 83.9 kg (185 lb)   12/20/16 82.3 kg (181 lb 8 oz)   12/13/16 81.2 kg (179 lb)              Today, you had the following     No orders found for display       Primary Care Provider Office Phone # Fax #    Guillermo Hicks [phone]       [facility] [place] [address]  [place] [address]        Equal Access to Services     [facility]: Hadii aad ku hadasho Soomaali, waaxda luqadaha, qaybta kaalmada adeegyada, waxay michaela haygaryn deedee krishnamurthy . So [facility] [phone].    ATENCIÓN: Si habla español, tiene a musa disposición servicios gratuitos de asistencia lingüística. Marnie al [phone].    We comply with applicable federal civil rights laws and [place] laws. We do not discriminate on the basis of race, color, national origin, age, disability, sex, sexual orientation, or gender identity.            Thank you!     Thank you for choosing [facility]  for your care. Our goal is always to provide you with excellent care. Hearing back from our patients is one way we can continue to improve our services. Please take a few minutes to complete the written survey that you may receive in the mail after your visit with us. Thank you!             Your Updated Medication List - Protect others around you: Learn how to safely use, store and throw away your medicines at www.disposemymeds.org.          This list is accurate as of 3/21/18  3:27 PM.  Always use your most recent med list.                   Brand Name  Dispense Instructions for use Diagnosis    * TYLENOL 325 MG tablet   Generic drug:  acetaminophen      Take 325 mg by mouth as needed        * acetaminophen 325 MG tablet    TYLENOL    100 tablet    Take 2 tablets (650 mg) by mouth every 6 hours as needed for other (surgical pain)    Renal mass, left       * Notice:  This list has 2 medication(s) that are the same as other medications prescribed for you. Read the directions carefully, and ask your doctor or other care provider to review them with you.

## 2018-04-18 ENCOUNTER — INFUSION THERAPY VISIT (OUTPATIENT)
Dept: INFUSION THERAPY | Facility: CLINIC | Age: 68
End: 2018-04-18
Attending: ALLERGY & IMMUNOLOGY
Payer: MEDICARE

## 2018-04-18 VITALS — DIASTOLIC BLOOD PRESSURE: 84 MMHG | HEART RATE: 79 BPM | TEMPERATURE: 96.6 F | SYSTOLIC BLOOD PRESSURE: 124 MMHG

## 2018-04-18 DIAGNOSIS — L50.1 IDIOPATHIC URTICARIA: Primary | ICD-10-CM

## 2018-04-18 PROCEDURE — 25000128 H RX IP 250 OP 636: Performed by: ALLERGY & IMMUNOLOGY

## 2018-04-18 PROCEDURE — 96372 THER/PROPH/DIAG INJ SC/IM: CPT

## 2018-04-18 RX ADMIN — OMALIZUMAB 300 MG: 202.5 INJECTION, SOLUTION SUBCUTANEOUS at 12:03

## 2018-04-18 NOTE — PROGRESS NOTES
Infusion Nursing Note:  Barrera Urena presents today for Xolair injection.    Patient seen by provider today: No   present during visit today: Not Applicable.    Note: N/A.    Intravenous Access:  No Intravenous access/labs at this visit.    Treatment Conditions:  Not Applicable.      Post Infusion Assessment:  Patient tolerated injection without incident.  Patient observed for 30 minutes post Xolair per protocol.    Discharge Plan:   Patient discharged in stable condition accompanied by: wife.  Departure Mode: Ambulatory.  Next injection scheduled for 5/23.    Maegan Olivia RN

## 2018-04-18 NOTE — MR AVS SNAPSHOT
After Visit Summary   4/18/2018    Barrera Urena    MRN: [id number]           Patient Information     Date Of Birth          1950        Visit Information        Provider Department      4/18/2018 11:30 AM ROOM 2 [facility] Infusion        Today's Diagnoses     Idiopathic urticaria    -  1       Follow-ups after your visit        Your next 10 appointments already scheduled     May 14, 2018 10:05 AM CDT   LAB with [facility] ([facility])    [address] [address]   [phone]           Please do not eat 10-12 hours before your appointment if you are coming in fasting for labs on lipids, cholesterol, or glucose (sugar). This does not apply to pregnant women. Water, hot tea and black coffee (with nothing added) are okay. Do not drink other fluids, diet soda or chew gum.            May 14, 2018 10:30 AM CDT   (Arrive by 10:15 AM)   CT CHEST/ABDOMEN/PELVIS W CONTRAST with WYCT1   [facility] ([facility])    [address] [address]   [phone]           Please bring any scans or X-rays taken at other hospitals, if similar tests were done. Also bring a list of your medicines, including vitamins, minerals and over-the-counter drugs. It is safest to leave personal items at home.  Be sure to tell your doctor:   If you have any allergies.   If there s any chance you are pregnant.   If you are breastfeeding.  You may have contrast for this exam. To prepare:   Do not eat or drink for 2 hours before your exam. If you need to take medicine, you may take it with small sips of water. (We may ask you to take liquid medicine as well.)   The day before your exam, drink extra fluids at least six 8-ounce glasses (unless your doctor tells you to restrict your fluids).   You will be given instructions on how to drink the contrast.  Patients over 70 or patients with diabetes or kidney problems:   If you  haven t had a blood test (creatinine test) within the last 30 days, the Cardiologist/Radiologist may require you to get this test prior to your exam.  If you have diabetes:   Continue to take your metformin medication on the day of your exam  Please wear loose clothing, such as a sweat suit or jogging clothes. Avoid snaps, zippers and other metal. We may ask you to undress and put on a hospital gown.  If you have any questions, please call the Imaging Department where you will have your exam.            May 23, 2018 11:00 AM CDT   Return Visit with Tushar Downs MD   [facility]    [address] [address]   [phone]            May 23, 2018 11:30 AM CDT   Level 2 with ROOM 7 [facility] ([facility])    [facility]  [address]  [facility] [address] [phone]              Who to contact     If you have questions or need follow up information about today's clinic visit or your schedule please contact [facility] directly at [phone].  Normal or non-critical lab and imaging results will be communicated to you by MyChart, letter or phone within 4 business days after the clinic has received the results. If you do not hear from us within 7 days, please contact the clinic through Instaradiohart or phone. If you have a critical or abnormal lab result, we will notify you by phone as soon as possible.  Submit refill requests through Veam Video or call your pharmacy and they will forward the refill request to us. Please allow 3 business days for your refill to be completed.          Additional Information About Your Visit        Veam Video Information     Veam Video gives you secure access to your electronic health record. If you see a primary care provider, you can also send messages to your care team and make appointments. If you have questions, please call your primary care  clinic.  If you do not have a primary care provider, please call [phone] and they will assist you.        Care EveryWhere ID     This is your Care EveryWhere ID. This could be used by other organizations to access your [facility] medical records  [id number]        Your Vitals Were     Pulse Temperature                79 96.6  F (35.9  C) (Oral)           Blood Pressure from Last 3 Encounters:   04/18/18 124/84   03/21/18 133/85   02/21/18 126/77    Weight from Last 3 Encounters:   05/24/17 83.9 kg (185 lb)   12/20/16 82.3 kg (181 lb 8 oz)   12/13/16 81.2 kg (179 lb)              Today, you had the following     No orders found for display       Primary Care Provider Office Phone # Fax #    Guillermo Hicks [phone]       [facility] [place] [address]  [place] [address]        Equal Access to Services     [facility]: Hadii aad ku hadasho Soomaali, waaxda luqadaha, qaybta kaalmada adeegyada, waxay daniellein haygaryn deedee krishnamurthy . So [facility] [phone].    ATENCIÓN: Si habla español, tiene a musa disposición servicios gratuitos de asistencia lingüística. Marnie al [phone].    We comply with applicable federal civil rights laws and [place] laws. We do not discriminate on the basis of race, color, national origin, age, disability, sex, sexual orientation, or gender identity.            Thank you!     Thank you for choosing [facility]  for your care. Our goal is always to provide you with excellent care. Hearing back from our patients is one way we can continue to improve our services. Please take a few minutes to complete the written survey that you may receive in the mail after your visit with us. Thank you!             Your Updated Medication List - Protect others around you: Learn how to safely use, store and throw away your medicines at www.disposemymeds.org.          This list is accurate as of 4/18/18  2:57 PM.  Always use your most recent med list.                   Brand  Name Dispense Instructions for use Diagnosis    * TYLENOL 325 MG tablet   Generic drug:  acetaminophen      Take 325 mg by mouth as needed        * acetaminophen 325 MG tablet    TYLENOL    100 tablet    Take 2 tablets (650 mg) by mouth every 6 hours as needed for other (surgical pain)    Renal mass, left       * Notice:  This list has 2 medication(s) that are the same as other medications prescribed for you. Read the directions carefully, and ask your doctor or other care provider to review them with you.

## 2018-05-09 ENCOUNTER — TELEPHONE (OUTPATIENT)
Dept: UROLOGY | Facility: CLINIC | Age: 68
End: 2018-05-09

## 2018-05-09 NOTE — TELEPHONE ENCOUNTER
Reason for Call:  Other call back    Detailed comments: pt calling stating he had an XR done at [facility], showing 2 spots on his kidney. He has a f/u appt on Wed 5/23 should he be seen sooner or is this ok to wait until then?    Phone Number Patient can be reached at: Home number on file [phone] (home)    Best Time: any     Can we leave a detailed message on this number? YES    Call taken on 5/9/2018 at 9:03 AM by Fiona Rooney

## 2018-05-09 NOTE — TELEPHONE ENCOUNTER
"Called and no HIPPA on file to speak with spouse.    Per last November note:  \"We will simply plan on seeing the patient back in another 6 months with another CMP and another CT of the chest, abdomen and pelvis. \"  Is due for these tests anyhow. Should complete before the scheduled appt and then use that appt to go over those results.  Geena Black RN  [facility]    "

## 2018-05-11 RX ORDER — IOPAMIDOL 755 MG/ML
90 INJECTION, SOLUTION INTRAVASCULAR ONCE
Status: COMPLETED | OUTPATIENT
Start: 2018-05-14 | End: 2018-05-14

## 2018-05-14 ENCOUNTER — HOSPITAL ENCOUNTER (OUTPATIENT)
Dept: CT IMAGING | Facility: CLINIC | Age: 68
Discharge: HOME | End: 2018-05-14
Attending: UROLOGY | Admitting: UROLOGY
Payer: MEDICARE

## 2018-05-14 DIAGNOSIS — C64.2 MALIGNANT NEOPLASM OF KIDNEY EXCLUDING RENAL PELVIS, LEFT (H): ICD-10-CM

## 2018-05-14 LAB
ALBUMIN SERPL-MCNC: 4.1 G/DL (ref 3.4–5)
ALP SERPL-CCNC: 57 U/L (ref 40–150)
ALT SERPL W P-5'-P-CCNC: 22 U/L (ref 0–70)
ANION GAP SERPL CALCULATED.3IONS-SCNC: 3 MMOL/L (ref 3–14)
AST SERPL W P-5'-P-CCNC: 12 U/L (ref 0–45)
BILIRUB SERPL-MCNC: 0.7 MG/DL (ref 0.2–1.3)
BUN SERPL-MCNC: 17 MG/DL (ref 7–30)
CALCIUM SERPL-MCNC: 9.2 MG/DL (ref 8.5–10.1)
CHLORIDE SERPL-SCNC: 105 MMOL/L (ref 94–109)
CO2 SERPL-SCNC: 29 MMOL/L (ref 20–32)
CREAT SERPL-MCNC: 1.32 MG/DL (ref 0.66–1.25)
GFR SERPL CREATININE-BSD FRML MDRD: 54 ML/MIN/1.7M2
GLUCOSE SERPL-MCNC: 127 MG/DL (ref 70–99)
POTASSIUM SERPL-SCNC: 4.6 MMOL/L (ref 3.4–5.3)
PROT SERPL-MCNC: 7.6 G/DL (ref 6.8–8.8)
SODIUM SERPL-SCNC: 137 MMOL/L (ref 133–144)

## 2018-05-14 PROCEDURE — 80053 COMPREHEN METABOLIC PANEL: CPT | Performed by: UROLOGY

## 2018-05-14 PROCEDURE — 71260 CT THORAX DX C+: CPT

## 2018-05-14 PROCEDURE — 36415 COLL VENOUS BLD VENIPUNCTURE: CPT | Performed by: UROLOGY

## 2018-05-14 PROCEDURE — 25000128 H RX IP 250 OP 636: Performed by: RADIOLOGY

## 2018-05-14 PROCEDURE — 25000125 ZZHC RX 250: Performed by: RADIOLOGY

## 2018-05-14 RX ADMIN — SODIUM CHLORIDE 62 ML: 9 INJECTION, SOLUTION INTRAVENOUS at 10:37

## 2018-05-14 RX ADMIN — IOPAMIDOL 90 ML: 755 INJECTION, SOLUTION INTRAVENOUS at 10:37

## 2018-05-23 ENCOUNTER — INFUSION THERAPY VISIT (OUTPATIENT)
Dept: INFUSION THERAPY | Facility: CLINIC | Age: 68
End: 2018-05-23
Attending: ALLERGY & IMMUNOLOGY
Payer: MEDICARE

## 2018-05-23 ENCOUNTER — OFFICE VISIT (OUTPATIENT)
Dept: UROLOGY | Facility: CLINIC | Age: 68
End: 2018-05-23
Payer: MEDICARE

## 2018-05-23 VITALS
HEART RATE: 80 BPM | SYSTOLIC BLOOD PRESSURE: 121 MMHG | DIASTOLIC BLOOD PRESSURE: 79 MMHG | RESPIRATION RATE: 16 BRPM | TEMPERATURE: 98 F

## 2018-05-23 VITALS — RESPIRATION RATE: 16 BRPM | SYSTOLIC BLOOD PRESSURE: 129 MMHG | HEART RATE: 86 BPM | DIASTOLIC BLOOD PRESSURE: 85 MMHG

## 2018-05-23 DIAGNOSIS — C64.2 MALIGNANT NEOPLASM OF KIDNEY EXCLUDING RENAL PELVIS, LEFT (H): Primary | ICD-10-CM

## 2018-05-23 DIAGNOSIS — L50.1 IDIOPATHIC URTICARIA: Primary | ICD-10-CM

## 2018-05-23 PROCEDURE — 96372 THER/PROPH/DIAG INJ SC/IM: CPT

## 2018-05-23 PROCEDURE — 25000128 H RX IP 250 OP 636: Performed by: ALLERGY & IMMUNOLOGY

## 2018-05-23 PROCEDURE — 99214 OFFICE O/P EST MOD 30 MIN: CPT | Performed by: UROLOGY

## 2018-05-23 RX ADMIN — OMALIZUMAB 300 MG: 202.5 INJECTION, SOLUTION SUBCUTANEOUS at 12:16

## 2018-05-23 ASSESSMENT — PAIN SCALES - GENERAL: PAINLEVEL: NO PAIN (0)

## 2018-05-23 NOTE — MR AVS SNAPSHOT
After Visit Summary   5/23/2018    Barrera Urena    MRN: [id number]           Patient Information     Date Of Birth          1950        Visit Information        Provider Department      5/23/2018 11:30 AM ROOM 7 [facility]        Today's Diagnoses     Idiopathic urticaria    -  1       Follow-ups after your visit        Your next 10 appointments already scheduled     Jun 20, 2018 11:30 AM CDT   Level 2 with ROOM 6 [facility] ([facility])    [facility]  [address]  [facility] [address]   [phone]            Sep 13, 2018 11:00 AM CDT   Return Visit with Tushar Downs MD   [facility]    [address] [facility] [address]   [phone]              Future tests that were ordered for you today     Open Future Orders        Priority Expected Expires Ordered    CT Chest/Abdomen/Pelvis w Contrast Routine 8/23/2018 5/23/2019 5/23/2018            Who to contact     If you have questions or need follow up information about today's clinic visit or your schedule please contact [facility] directly at [phone].  Normal or non-critical lab and imaging results will be communicated to you by MyChart, letter or phone within 4 business days after the clinic has received the results. If you do not hear from us within 7 days, please contact the clinic through MyChart or phone. If you have a critical or abnormal lab result, we will notify you by phone as soon as possible.  Submit refill requests through Wedo Shopping or call your pharmacy and they will forward the refill request to us. Please allow 3 business days for your refill to be completed.          Additional Information About Your Visit        FotoSwipehart Information     Wedo Shopping gives you secure access to your electronic health record. If you see a primary care provider, you can also send messages to  your care team and make appointments. If you have questions, please call your primary care clinic.  If you do not have a primary care provider, please call [phone] and they will assist you.        Care EveryWhere ID     This is your Care EveryWhere ID. This could be used by other organizations to access your [facility] medical records  [id number]        Your Vitals Were     Pulse Respirations                86 16           Blood Pressure from Last 3 Encounters:   05/23/18 129/85   05/23/18 121/79   04/18/18 124/84    Weight from Last 3 Encounters:   05/24/17 83.9 kg (185 lb)   12/20/16 82.3 kg (181 lb 8 oz)   12/13/16 81.2 kg (179 lb)              Today, you had the following     No orders found for display       Primary Care Provider Office Phone # Fax #    Guillermo Hicks [phone]       [facility] [place] [address]  [place] [address]        Equal Access to Services     [facility]: Hadii aad ku hadasho Soomaali, waaxda luqadaha, qaybta kaalmada adeegyada, waxay daniellein haygaryn deedee krishnamurthy . So [facility] [phone].    ATENCIÓN: Si habla español, tiene a musa disposición servicios gratuitos de asistencia lingüística. Llame al [phone].    We comply with applicable federal civil rights laws and [place] laws. We do not discriminate on the basis of race, color, national origin, age, disability, sex, sexual orientation, or gender identity.            Thank you!     Thank you for choosing [facility]  for your care. Our goal is always to provide you with excellent care. Hearing back from our patients is one way we can continue to improve our services. Please take a few minutes to complete the written survey that you may receive in the mail after your visit with us. Thank you!             Your Updated Medication List - Protect others around you: Learn how to safely use, store and throw away your medicines at www.disposemymeds.org.          This list is accurate as of 5/23/18  12:34 PM.  Always use your most recent med list.                   Brand Name Dispense Instructions for use Diagnosis    * TYLENOL 325 MG tablet   Generic drug:  acetaminophen      Take 325 mg by mouth as needed        * acetaminophen 325 MG tablet    TYLENOL    100 tablet    Take 2 tablets (650 mg) by mouth every 6 hours as needed for other (surgical pain)    Renal mass, left       * Notice:  This list has 2 medication(s) that are the same as other medications prescribed for you. Read the directions carefully, and ask your doctor or other care provider to review them with you.

## 2018-05-23 NOTE — MR AVS SNAPSHOT
After Visit Summary   5/23/2018    Barrera Urena    MRN: [id number]           Patient Information     Date Of Birth          1950        Visit Information        Provider Department      5/23/2018 11:00 AM Tushar Downs MD [facility]        Today's Diagnoses     Malignant neoplasm of kidney excluding renal pelvis, left (H)    -  1      Care Instructions    Per Physician's instructions            Follow-ups after your visit        Your next 10 appointments already scheduled     Jun 20, 2018 11:30 AM CDT   Level 2 with ROOM 6 [facility] ([facility])    [facility]  [address]  [facility] [address]   [phone]            Sep 06, 2018 10:00 AM CDT   CT CHEST/ABDOMEN/PELVIS W CONTRAST with WYCT1   [facility] CT ([facility])    [address]  [facility] [address]   [phone]           Please bring any scans or X-rays taken at other hospitals, if similar tests were done. Also bring a list of your medicines, including vitamins, minerals and over-the-counter drugs. It is safest to leave personal items at home.  Be sure to tell your doctor:   If you have any allergies.   If there s any chance you are pregnant.   If you are breastfeeding.  How to prepare:   Do not eat or drink for 2 hours before your exam. If you need to take medicine, you may take it with small sips of water. (We may ask you to take liquid medicine as well.)   Please wear loose clothing, such as a sweat suit or jogging clothes. Avoid snaps, zippers and other metal. We may ask you to undress and put on a hospital gown.  Please arrive 30 minutes early for your CT. Once in the department you might be asked to drink water 15-20 minutes prior to your exam.  If indicated you may be asked to drink an oral contrast in advance of your CT.  If this is the case, the imaging team will let you know or be in contact with  you prior to your appointment  Patients over 70 or patients with diabetes or kidney problems:   If you haven t had a blood test (creatinine test) within the last 30 days, the Cardiologist/Radiologist may require you to get this test prior to your exam.  If you have diabetes:   Continue to take your metformin medication on the day of your exam  If you have any questions, please call the Imaging Department where you will have your exam.            Sep 06, 2018 10:30 AM CDT   LAB with [facility] ([facility])    [address] [address]   [phone]           Please do not eat 10-12 hours before your appointment if you are coming in fasting for labs on lipids, cholesterol, or glucose (sugar). This does not apply to pregnant women. Water, hot tea and black coffee (with nothing added) are okay. Do not drink other fluids, diet soda or chew gum.            Sep 13, 2018 11:00 AM CDT   Return Visit with Tushar Downs MD   [facility] ([facility])    [address] [address]   [phone]              Who to contact     If you have questions or need follow up information about today's clinic visit or your schedule please contact [facility] directly at [phone].  Normal or non-critical lab and imaging results will be communicated to you by Stkr.ithart, letter or phone within 4 business days after the clinic has received the results. If you do not hear from us within 7 days, please contact the clinic through Stkr.ithart or phone. If you have a critical or abnormal lab result, we will notify you by phone as soon as possible.  Submit refill requests through Notehall or call your pharmacy and they will forward the refill request to us. Please allow 3 business days for your refill to be completed.          Additional Information About Your Visit        Notehall Information     Notehall gives you secure  access to your electronic health record. If you see a primary care provider, you can also send messages to your care team and make appointments. If you have questions, please call your primary care clinic.  If you do not have a primary care provider, please call [phone] and they will assist you.        Care EveryWhere ID     This is your Care EveryWhere ID. This could be used by other organizations to access your [facility] medical records  [id number]        Your Vitals Were     Pulse Temperature Respirations             80 98  F (36.7  C) (Tympanic) 16          Blood Pressure from Last 3 Encounters:   05/23/18 129/85   05/23/18 121/79   04/18/18 124/84    Weight from Last 3 Encounters:   05/24/17 83.9 kg (185 lb)   12/20/16 82.3 kg (181 lb 8 oz)   12/13/16 81.2 kg (179 lb)               Primary Care Provider Office Phone # Fax #    Guillermo Hicks [phone]       [facility] [place] [address]  [place] [address]        Equal Access to Services     [facility] : Hadii aad ku hadasho Soomaali, waaxda luqadaha, qaybta kaalmada adeegyada, waxay idiin hayaan deedee krishnamurthy . So [facility] [phone].    ATENCIÓN: Si habla español, tiene a musa disposición servicios gratuitos de asistencia lingüística. Llame al [phone].    We comply with applicable federal civil rights laws and [place] laws. We do not discriminate on the basis of race, color, national origin, age, disability, sex, sexual orientation, or gender identity.            Thank you!     Thank you for choosing [facility]  for your care. Our goal is always to provide you with excellent care. Hearing back from our patients is one way we can continue to improve our services. Please take a few minutes to complete the written survey that you may receive in the mail after your visit with us. Thank you!             Your Updated Medication List - Protect others around you: Learn how to safely use, store and throw away your  medicines at www.disposemymeds.org.          This list is accurate as of 5/23/18 11:59 PM.  Always use your most recent med list.                   Brand Name Dispense Instructions for use Diagnosis    * TYLENOL 325 MG tablet   Generic drug:  acetaminophen      Take 325 mg by mouth as needed        * acetaminophen 325 MG tablet    TYLENOL    100 tablet    Take 2 tablets (650 mg) by mouth every 6 hours as needed for other (surgical pain)    Renal mass, left       * Notice:  This list has 2 medication(s) that are the same as other medications prescribed for you. Read the directions carefully, and ask your doctor or other care provider to review them with you.

## 2018-05-23 NOTE — PROGRESS NOTES
Infusion Nursing Note:  Barrera Urena presents today for Xolair.    Patient seen by provider today: No   present during visit today: Not Applicable.    Note: N/A.    Intravenous Access:  No Intravenous access/labs at this visit.    Treatment Conditions:  Not Applicable.      Post Infusion Assessment:  Patient tolerated injection without incident. Pt. Advised of 30 minute observation, pt. Did not stay for observation.    Discharge Plan:   Patient discharged in stable condition accompanied by: wife.  Departure Mode: Ambulatory.    James Markham RN

## 2018-05-23 NOTE — PROGRESS NOTES
Visit Date:   05/23/2018      REASON FOR VISIT TODAY:  History of kidney cancer.      BRIEF COURSE:  Mr. Urena is a 68-year-old gentleman followed in our clinic for history of a left-sided renal cell carcinoma.  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  He has had no evidence of recurrence to date.  The patient does have one 4-mm nonspecific pulmonary nodule in the upper lobe of the right lung, which is currently being followed.  The patient has had no other evidence of recurrence to date.  The patient comes in today noting no changes in his health since we last saw him.  He has had no blood in his urine and otherwise is doing well.        PHYSICAL EXAMINATION:   VITAL SIGNS:  His blood pressure is 121/79.  Pulse is 80.   GENERAL:  He is in no acute distress.      DATA:  The patient's CMP from 05/14/2018 notes a creatinine of 1.32, normal LFTs, glucose of 127.  The patient's CT scan of the chest, abdomen and pelvis from 05/14/2018 shows the right upper lobe nodule measuring approximately 0.5 cm compared to previous values of 4.3 mm.      ASSESSMENT AND PLAN:  Over half of today's 25-minute visit was spent counseling the patient regarding his kidney cancer and his indeterminate pulmonary nodule.  I suggested to Mr. Urena that we are pleased to see that there is no obvious evidence of recurrence at this time.  However, this pulmonary nodule continues to be indeterminate.  We went over the images together carefully, and I counseled the patient that while the lesion is measuring slightly larger than it had on previous measurements that this certainly could also be due to where the CT scan happened to cut through the lesion, given the fact that these changes are extremely small at this time.  We discussed options including moving forward with attempt at biopsy of the lesion or wedge resection of  the lung, which is likely what would be required to biopsy this very small lesion versus continued observation.  The patient wishes to continue to observe things, which seems most reasonable.  We will see the patient back, however, in 3 months instead of 6 months to follow this lesion a little bit more carefully.  Certainly, should this show convincing evidence of enlargement, we would go ahead and move forward with a biopsy in that context.  Mr. Stewart is in agreement with the plan.  We will see him back in 3 months with a CT scan of the chest, abdomen and pelvis again and another CMP.         JOHN STREETER MD             D: 2018   T: 2018   MT: NTS      Name:     ANETTE STEWART   MRN:      [id number]        Account:      [id number]   :      1950           Visit Date:   2018      Document: [id number]

## 2018-05-23 NOTE — NURSING NOTE
"Chief Complaint   Patient presents with     Results     CT and Lab work        Initial /79 (BP Location: Left arm, Patient Position: Chair, Cuff Size: Adult Regular)  Pulse 80  Temp 98  F (36.7  C) (Tympanic)  Resp 16 Estimated body mass index is 26.54 kg/(m^2) as calculated from the following:    Height as of 5/24/17: 1.778 m (5' 10\").    Weight as of 5/24/17: 83.9 kg (185 lb).  BP completed using cuff size: regular  Medications and allergies reviewed.      Clare BELTRAN CMA     "

## 2018-06-20 ENCOUNTER — INFUSION THERAPY VISIT (OUTPATIENT)
Dept: INFUSION THERAPY | Facility: CLINIC | Age: 68
End: 2018-06-20
Attending: ALLERGY & IMMUNOLOGY
Payer: MEDICARE

## 2018-06-20 VITALS — RESPIRATION RATE: 16 BRPM | SYSTOLIC BLOOD PRESSURE: 140 MMHG | HEART RATE: 91 BPM | DIASTOLIC BLOOD PRESSURE: 85 MMHG

## 2018-06-20 DIAGNOSIS — L50.1 IDIOPATHIC URTICARIA: Primary | ICD-10-CM

## 2018-06-20 PROCEDURE — 96372 THER/PROPH/DIAG INJ SC/IM: CPT

## 2018-06-20 PROCEDURE — 25000128 H RX IP 250 OP 636: Performed by: ALLERGY & IMMUNOLOGY

## 2018-06-20 RX ADMIN — OMALIZUMAB 300 MG: 202.5 INJECTION, SOLUTION SUBCUTANEOUS at 11:46

## 2018-06-20 NOTE — PROGRESS NOTES
Infusion Nursing Note:  Barrera Urena presents today for Xolair.    Patient seen by provider today: No   present during visit today: Not Applicable.    Note: N/A.    Intravenous Access:  No Intravenous access/labs at this visit.    Treatment Conditions:  Not Applicable.      Post Infusion Assessment:  Patient tolerated injection without incident.  Patient observed for 0 minutes post Xolair, advised of observation period.    Discharge Plan:   Patient discharged in stable condition accompanied by: self.  Departure Mode: Ambulatory.    James Markham RN

## 2018-06-20 NOTE — MR AVS SNAPSHOT
After Visit Summary   6/20/2018    Barrera Urena    MRN: [id number]           Patient Information     Date Of Birth          1950        Visit Information        Provider Department      6/20/2018 11:30 AM ROOM 6 [facility]        Today's Diagnoses     Idiopathic urticaria    -  1       Follow-ups after your visit        Your next 10 appointments already scheduled     Jul 18, 2018 10:30 AM CDT   Level 2 with ROOM 10 [facility] ([facility])    n Medical Ctr [facility]  [address]  [facility] [address]   [phone]            Sep 06, 2018 10:00 AM CDT   CT CHEST/ABDOMEN/PELVIS W CONTRAST with WYCT1   [facility] CT ([facility])    [address] [place]  [facility] [address]   [phone]           Please bring any scans or X-rays taken at other hospitals, if similar tests were done. Also bring a list of your medicines, including vitamins, minerals and over-the-counter drugs. It is safest to leave personal items at home.  Be sure to tell your doctor:   If you have any allergies.   If there s any chance you are pregnant.   If you are breastfeeding.  How to prepare:   Do not eat or drink for 2 hours before your exam. If you need to take medicine, you may take it with small sips of water. (We may ask you to take liquid medicine as well.)   Please wear loose clothing, such as a sweat suit or jogging clothes. Avoid snaps, zippers and other metal. We may ask you to undress and put on a hospital gown.  Please arrive 30 minutes early for your CT. Once in the department you might be asked to drink water 15-20 minutes prior to your exam.  If indicated you may be asked to drink an oral contrast in advance of your CT.  If this is the case, the imaging team will let you know or be in contact with you prior to your appointment  Patients over 70 or patients with diabetes or kidney problems:   If you haven t had a blood  test (creatinine test) within the last 30 days, the Cardiologist/Radiologist may require you to get this test prior to your exam.  If you have diabetes:   Continue to take your metformin medication on the day of your exam  If you have any questions, please call the Imaging Department where you will have your exam.            Sep 06, 2018 10:30 AM CDT   LAB with W[place] LAB   [facility] ([facility])    [address] [facility] [address]   [phone]           Please do not eat 10-12 hours before your appointment if you are coming in fasting for labs on lipids, cholesterol, or glucose (sugar). This does not apply to pregnant women. Water, hot tea and black coffee (with nothing added) are okay. Do not drink other fluids, diet soda or chew gum.            Sep 13, 2018 11:00 AM CDT   Return Visit with Tushar Downs MD   [facility] ([facility])    [address] [facility] [address]   [phone]              Who to contact     If you have questions or need follow up information about today's clinic visit or your schedule please contact [facility] directly at [phone].  Normal or non-critical lab and imaging results will be communicated to you by [employer] Communications Holdingshart, letter or phone within 4 business days after the clinic has received the results. If you do not hear from us within 7 days, please contact the clinic through Factorlit or phone. If you have a critical or abnormal lab result, we will notify you by phone as soon as possible.  Submit refill requests through FantasyHub or call your pharmacy and they will forward the refill request to us. Please allow 3 business days for your refill to be completed.          Additional Information About Your Visit        FantasyHub Information     F[employer] gives you secure access to your electronic health record. If you see a primary care provider, you can also send messages to your care team and  make appointments. If you have questions, please call your primary care clinic.  If you do not have a primary care provider, please call [phone] and they will assist you.        Care EveryWhere ID     This is your Care EveryWhere ID. This could be used by other organizations to access your [place] medical records  [id number]        Your Vitals Were     Pulse Respirations                91 16           Blood Pressure from Last 3 Encounters:   06/20/18 140/85   05/23/18 129/85   05/23/18 121/79    Weight from Last 3 Encounters:   05/24/17 83.9 kg (185 lb)   12/20/16 82.3 kg (181 lb 8 oz)   12/13/16 81.2 kg (179 lb)              Today, you had the following     No orders found for display       Primary Care Provider Office Phone # Fax #    Guillermo Hicks [phone]       [facility] [place] [address]  [place] [address]        Equal Access to Services     [facility] : Hadii aad ku hadasho Soomaali, waaxda luqadaha, qaybta kaalmada adeegyada, meenu santosin haygaryn deedee krishnamurthy . So [facility] [phone].    ATENCIÓN: Si habla español, tiene a musa disposición servicios gratuitos de asistencia lingüística. Llame al [phone].    We comply with applicable federal civil rights laws and [place] laws. We do not discriminate on the basis of race, color, national origin, age, disability, sex, sexual orientation, or gender identity.            Thank you!     Thank you for choosing [facility]  for your care. Our goal is always to provide you with excellent care. Hearing back from our patients is one way we can continue to improve our services. Please take a few minutes to complete the written survey that you may receive in the mail after your visit with us. Thank you!             Your Updated Medication List - Protect others around you: Learn how to safely use, store and throw away your medicines at www.disposemymeds.org.          This list is accurate as of 6/20/18 12:14 PM.  Always use  your most recent med list.                   Brand Name Dispense Instructions for use Diagnosis    * TYLENOL 325 MG tablet   Generic drug:  acetaminophen      Take 325 mg by mouth as needed        * acetaminophen 325 MG tablet    TYLENOL    100 tablet    Take 2 tablets (650 mg) by mouth every 6 hours as needed for other (surgical pain)    Renal mass, left       * Notice:  This list has 2 medication(s) that are the same as other medications prescribed for you. Read the directions carefully, and ask your doctor or other care provider to review them with you.

## 2018-07-23 ENCOUNTER — INFUSION THERAPY VISIT (OUTPATIENT)
Dept: INFUSION THERAPY | Facility: CLINIC | Age: 68
End: 2018-07-23
Attending: ALLERGY & IMMUNOLOGY
Payer: MEDICARE

## 2018-07-23 VITALS — DIASTOLIC BLOOD PRESSURE: 92 MMHG | SYSTOLIC BLOOD PRESSURE: 153 MMHG | HEART RATE: 72 BPM

## 2018-07-23 DIAGNOSIS — L50.1 IDIOPATHIC URTICARIA: Primary | ICD-10-CM

## 2018-07-23 PROCEDURE — 25000128 H RX IP 250 OP 636: Performed by: ALLERGY & IMMUNOLOGY

## 2018-07-23 PROCEDURE — 96372 THER/PROPH/DIAG INJ SC/IM: CPT

## 2018-07-23 RX ADMIN — OMALIZUMAB 300 MG: 202.5 INJECTION, SOLUTION SUBCUTANEOUS at 11:05

## 2018-07-23 NOTE — PROGRESS NOTES
Infusion Nursing Note:  Barrera Pozoalexsandra presents today for Xolair    Patient seen by provider today: No   present during visit today: Not Applicable.    Note: N/A.    Intravenous Access:  No Intravenous access/labs at this visit.    Treatment Conditions:  Not Applicable.      Post Infusion Assessment:  Patient tolerated Xolair injections (20) subcutaneous to left upper arm without incident.  Pt did not prefer to stay for an observation period post Xolair injections.    Discharge Plan:   Patient discharged in stable condition accompanied by: wife.  Departure Mode: Ambulatory.  Pt to return on 8/20/18 at 10:30 am for next Xolair injections.     Manjula Lambert RN

## 2018-07-23 NOTE — MR AVS SNAPSHOT
After Visit Summary   7/23/2018    Barrera Urena    MRN: [id number]           Patient Information     Date Of Birth          1950        Visit Information        Provider Department      7/23/2018 10:30 AM ROOM 2 [facility]        Today's Diagnoses     Idiopathic urticaria    -  1       Follow-ups after your visit        Your next 10 appointments already scheduled     Aug 20, 2018 10:30 AM CDT   Level 2 with ROOM 6 [facility] ([facility])    n Medical Ctr [facility]  [address]  [facility] [address]   [phone]            Sep 06, 2018 10:00 AM CDT   CT CHEST/ABDOMEN/PELVIS W CONTRAST with WYCT1   [facility] CT ([facility])    [address] [place]  [facility] [address]   [phone]           Please bring any scans or X-rays taken at other hospitals, if similar tests were done. Also bring a list of your medicines, including vitamins, minerals and over-the-counter drugs. It is safest to leave personal items at home.  Be sure to tell your doctor:   If you have any allergies.   If there s any chance you are pregnant.   If you are breastfeeding.  How to prepare:   Do not eat or drink for 2 hours before your exam. If you need to take medicine, you may take it with small sips of water. (We may ask you to take liquid medicine as well.)   Please wear loose clothing, such as a sweat suit or jogging clothes. Avoid snaps, zippers and other metal. We may ask you to undress and put on a hospital gown.  Please arrive 30 minutes early for your CT. Once in the department you might be asked to drink water 15-20 minutes prior to your exam.  If indicated you may be asked to drink an oral contrast in advance of your CT.  If this is the case, the imaging team will let you know or be in contact with you prior to your appointment  Patients over 70 or patients with diabetes or kidney problems:   If you haven t had a blood  test (creatinine test) within the last 30 days, the Cardiologist/Radiologist may require you to get this test prior to your exam.  If you have diabetes:   Continue to take your metformin medication on the day of your exam  If you have any questions, please call the Imaging Department where you will have your exam.            Sep 06, 2018 10:30 AM CDT   LAB with W[place] LAB   [facility] ([facility])    [address] [facility] [address]   [phone]           Please do not eat 10-12 hours before your appointment if you are coming in fasting for labs on lipids, cholesterol, or glucose (sugar). This does not apply to pregnant women. Water, hot tea and black coffee (with nothing added) are okay. Do not drink other fluids, diet soda or chew gum.            Sep 13, 2018 11:00 AM CDT   Return Visit with Tushar Downs MD   [facility] ([facility])    [address] [address]   [phone]              Who to contact     If you have questions or need follow up information about today's clinic visit or your schedule please contact [facility] directly at [phone].  Normal or non-critical lab and imaging results will be communicated to you by onefortyhart, letter or phone within 4 business days after the clinic has received the results. If you do not hear from us within 7 days, please contact the clinic through GCWt or phone. If you have a critical or abnormal lab result, we will notify you by phone as soon as possible.  Submit refill requests through [employer] or call your pharmacy and they will forward the refill request to us. Please allow 3 business days for your refill to be completed.          Additional Information About Your Visit        [employer] Information     [employer] gives you secure access to your electronic health record. If you see a primary care provider, you can also send messages to your care team and  make appointments. If you have questions, please call your primary care clinic.  If you do not have a primary care provider, please call [phone] and they will assist you.        Care EveryWhere ID     This is your Care EveryWhere ID. This could be used by other organizations to access your [place] medical records  [id number]        Your Vitals Were     Pulse                   72            Blood Pressure from Last 3 Encounters:   07/23/18 (!) 153/92   06/20/18 140/85   05/23/18 129/85    Weight from Last 3 Encounters:   05/24/17 83.9 kg (185 lb)   12/20/16 82.3 kg (181 lb 8 oz)   12/13/16 81.2 kg (179 lb)              Today, you had the following     No orders found for display       Primary Care Provider Office Phone # Fax #    Guillermo Hicks [phone]       Counts include 234 beds at the [facility] [place] [address]  [place] [address]        Equal Access to Services     [facility] : Hadii aad ku hadasho Soomaali, waaxda luqadaha, qaybta kaalmada adeegyada, waxay daniellein haygaryn deedee krishnamurthy . So [facility] [phone].    ATENCIÓN: Si habla español, tiene a musa disposición servicios gratuitos de asistencia lingüística. Llame al [phone].    We comply with applicable federal civil rights laws and [place] laws. We do not discriminate on the basis of race, color, national origin, age, disability, sex, sexual orientation, or gender identity.            Thank you!     Thank you for choosing [facility]  for your care. Our goal is always to provide you with excellent care. Hearing back from our patients is one way we can continue to improve our services. Please take a few minutes to complete the written survey that you may receive in the mail after your visit with us. Thank you!             Your Updated Medication List - Protect others around you: Learn how to safely use, store and throw away your medicines at www.disposemymeds.org.          This list is accurate as of 7/23/18  4:40 PM.  Always use your  most recent med list.                   Brand Name Dispense Instructions for use Diagnosis    * TYLENOL 325 MG tablet   Generic drug:  acetaminophen      Take 325 mg by mouth as needed        * acetaminophen 325 MG tablet    TYLENOL    100 tablet    Take 2 tablets (650 mg) by mouth every 6 hours as needed for other (surgical pain)    Renal mass, left       * Notice:  This list has 2 medication(s) that are the same as other medications prescribed for you. Read the directions carefully, and ask your doctor or other care provider to review them with you.

## 2018-08-20 ENCOUNTER — INFUSION THERAPY VISIT (OUTPATIENT)
Dept: INFUSION THERAPY | Facility: CLINIC | Age: 68
End: 2018-08-20
Attending: INTERNAL MEDICINE
Payer: MEDICARE

## 2018-08-20 VITALS
SYSTOLIC BLOOD PRESSURE: 148 MMHG | HEART RATE: 74 BPM | RESPIRATION RATE: 16 BRPM | TEMPERATURE: 97.3 F | DIASTOLIC BLOOD PRESSURE: 91 MMHG

## 2018-08-20 DIAGNOSIS — L50.1 IDIOPATHIC URTICARIA: Primary | ICD-10-CM

## 2018-08-20 PROCEDURE — 96372 THER/PROPH/DIAG INJ SC/IM: CPT

## 2018-08-20 PROCEDURE — 96365 THER/PROPH/DIAG IV INF INIT: CPT

## 2018-08-20 PROCEDURE — 25000128 H RX IP 250 OP 636: Performed by: ALLERGY & IMMUNOLOGY

## 2018-08-20 RX ADMIN — OMALIZUMAB 300 MG: 202.5 INJECTION, SOLUTION SUBCUTANEOUS at 10:38

## 2018-08-20 ASSESSMENT — PAIN SCALES - GENERAL: PAINLEVEL: NO PAIN (0)

## 2018-08-20 NOTE — PROGRESS NOTES
Infusion Nursing Note:  Barrera Urena presents today for Xolair.    Patient seen by provider today: No   present during visit today: Not Applicable.    Note: N/A.    Intravenous Access:  No Intravenous access/labs at this visit.    Treatment Conditions:  Not Applicable.      Post Infusion Assessment:  Patient tolerated injection without incident.  Patient observed for 0 minutes post Xolair per protocol, advised of obs time.  Site patent and intact, free from redness, edema or discomfort.  No evidence of extravasations.  Access discontinued per protocol.    Discharge Plan:   Patient discharged in stable condition accompanied by: self.  Departure Mode: Ambulatory.    James Markham RN

## 2018-08-20 NOTE — MR AVS SNAPSHOT
After Visit Summary   8/20/2018    Barrera Urena    MRN: [id number]           Patient Information     Date Of Birth          1950        Visit Information        Provider Department      8/20/2018 10:30 AM ROOM 6 [facility]        Today's Diagnoses     Idiopathic urticaria    -  1       Follow-ups after your visit        Your next 10 appointments already scheduled     Sep 06, 2018 10:00 AM CDT   CT CHEST/ABDOMEN/PELVIS W CONTRAST with WYCT1   [facility] CT ([facility])    [address] [facility] [address]   [phone]           Please bring any scans or X-rays taken at other hospitals, if similar tests were done. Also bring a list of your medicines, including vitamins, minerals and over-the-counter drugs. It is safest to leave personal items at home.  Be sure to tell your doctor:   If you have any allergies.   If there s any chance you are pregnant.   If you are breastfeeding.  How to prepare:   Do not eat or drink for 2 hours before your exam. If you need to take medicine, you may take it with small sips of water. (We may ask you to take liquid medicine as well.)   Please wear loose clothing, such as a sweat suit or jogging clothes. Avoid snaps, zippers and other metal. We may ask you to undress and put on a hospital gown.  Please arrive 30 minutes early for your CT. Once in the department you might be asked to drink water 15-20 minutes prior to your exam.  If indicated you may be asked to drink an oral contrast in advance of your CT.  If this is the case, the imaging team will let you know or be in contact with you prior to your appointment  Patients over 70 or patients with diabetes or kidney problems:   If you haven t had a blood test (creatinine test) within the last 30 days, the Cardiologist/Radiologist may require you to get this test prior to your exam.  If you have diabetes:   Continue to take your metformin medication on the day of your  exam  If you have any questions, please call the Imaging Department where you will have your exam.            Sep 06, 2018 10:30 AM CDT   LAB with W[place] LAB   [facility]    [address] [facility] [address]   [phone]           Please do not eat 10-12 hours before your appointment if you are coming in fasting for labs on lipids, cholesterol, or glucose (sugar). This does not apply to pregnant women. Water, hot tea and black coffee (with nothing added) are okay. Do not drink other fluids, diet soda or chew gum.            Sep 13, 2018 11:00 AM CDT   Return Visit with Tushar Downs MD   [facility] ([facility]    [address] [facility] [address] [facility] [address]   [phone]            Sep 17, 2018 10:30 AM CDT   Level 2 with ROOM 3 [facility] ([facility])    [employer] [facility]  [address]  [facility] [address]   [phone]              Who to contact     If you have questions or need follow up information about today's clinic visit or your schedule please contact [facility] directly at [phone].  Normal or non-critical lab and imaging results will be communicated to you by MyChart, letter or phone within 4 business days after the clinic has received the results. If you do not hear from us within 7 days, please contact the clinic through MyChart or phone. If you have a critical or abnormal lab result, we will notify you by phone as soon as possible.  Submit refill requests through LegalZoom or call your pharmacy and they will forward the refill request to us. Please allow 3 business days for your refill to be completed.          Additional Information About Your Visit        LegalZoom Information     LegalZoom gives you secure access to your electronic health record. If you see a primary care provider, you can also send messages to your care team and  make appointments. If you have questions, please call your primary care clinic.  If you do not have a primary care provider, please call [phone] and they will assist you.        Care EveryWhere ID     This is your Care EveryWhere ID. This could be used by other organizations to access your [place] medical records  [id number]        Your Vitals Were     Pulse Temperature Respirations             74 97.3  F (36.3  C) (Tympanic) 16          Blood Pressure from Last 3 Encounters:   08/20/18 (!) 148/91   07/23/18 (!) 153/92   06/20/18 140/85    Weight from Last 3 Encounters:   05/24/17 83.9 kg (185 lb)   12/20/16 82.3 kg (181 lb 8 oz)   12/13/16 81.2 kg (179 lb)              Today, you had the following     No orders found for display       Primary Care Provider Office Phone # Fax #    Guillermo Hicks [phone]       [facility] [place] [address]  [facility] [address]        Equal Access to Services     [facility] AH: Hadii aad ku hadasho Soomaali, waaxda luqadaha, qaybta kaalmada adeegyada, waxay idiin hayaan deedee khpierre krishnamurthy . So [facility] [phone].    ATENCIÓN: Si habla español, tiene a musa disposición servicios gratuitos de asistencia lingüística. LlSamaritan North Health Center [phone].    We comply with applicable federal civil rights laws and [place] laws. We do not discriminate on the basis of race, color, national origin, age, disability, sex, sexual orientation, or gender identity.            Thank you!     Thank you for choosing [facility]  for your care. Our goal is always to provide you with excellent care. Hearing back from our patients is one way we can continue to improve our services. Please take a few minutes to complete the written survey that you may receive in the mail after your visit with us. Thank you!             Your Updated Medication List - Protect others around you: Learn how to safely use, store and throw away your medicines at www.disposemymeds.org.          This list is  accurate as of 8/20/18 10:53 AM.  Always use your most recent med list.                   Brand Name Dispense Instructions for use Diagnosis    * TYLENOL 325 MG tablet   Generic drug:  acetaminophen      Take 325 mg by mouth as needed        * acetaminophen 325 MG tablet    TYLENOL    100 tablet    Take 2 tablets (650 mg) by mouth every 6 hours as needed for other (surgical pain)    Renal mass, left       * Notice:  This list has 2 medication(s) that are the same as other medications prescribed for you. Read the directions carefully, and ask your doctor or other care provider to review them with you.

## 2018-08-30 ENCOUNTER — TELEPHONE (OUTPATIENT)
Dept: UROLOGY | Facility: CLINIC | Age: 68
End: 2018-08-30

## 2018-08-30 NOTE — TELEPHONE ENCOUNTER
Reason for Call:  Other FYI    Detailed comments: pt [facility] calling [facility]. He is having [facility] CT w/ contrast on Sept 6th for [place]. [facility] w/ out contrast on his head for his tremors. Would like dr to be aware of this and if he would like to add or have any suggestions about this. [facility] not [facility] yet. This is an FYI only     Phone Number Patient can be reached at: Home number on file [phone] (home)    Best Time: any     Can we leave a detailed message on this number? YES    Call taken on 8/30/2018 at 12:48 PM by Fiona Rooney

## 2018-08-30 NOTE — TELEPHONE ENCOUNTER
Left message for pt on identified voicemail that Dr. Downs was notified of pts note below and that he did not have anything to add at this time. Advised to call if they had questions.  Joaquina RITTER RN BSN PHN  Specialty Clinics

## 2018-08-31 DIAGNOSIS — C64.2 MALIGNANT NEOPLASM OF LEFT KIDNEY, EXCEPT RENAL PELVIS (H): Primary | ICD-10-CM

## 2018-09-05 RX ORDER — IOPAMIDOL 755 MG/ML
90 INJECTION, SOLUTION INTRAVASCULAR ONCE
Status: COMPLETED | OUTPATIENT
Start: 2018-09-06 | End: 2018-09-06

## 2018-09-06 ENCOUNTER — HOSPITAL ENCOUNTER (OUTPATIENT)
Dept: CT IMAGING | Facility: CLINIC | Age: 68
Discharge: HOME | End: 2018-09-06
Attending: UROLOGY | Admitting: UROLOGY
Payer: MEDICARE

## 2018-09-06 DIAGNOSIS — C64.2 MALIGNANT NEOPLASM OF KIDNEY EXCLUDING RENAL PELVIS, LEFT (H): ICD-10-CM

## 2018-09-06 DIAGNOSIS — C64.2 MALIGNANT NEOPLASM OF LEFT KIDNEY, EXCEPT RENAL PELVIS (H): ICD-10-CM

## 2018-09-06 LAB
ALBUMIN SERPL-MCNC: 3.8 G/DL (ref 3.4–5)
ALP SERPL-CCNC: 63 U/L (ref 40–150)
ALT SERPL W P-5'-P-CCNC: 23 U/L (ref 0–70)
ANION GAP SERPL CALCULATED.3IONS-SCNC: 5 MMOL/L (ref 3–14)
AST SERPL W P-5'-P-CCNC: 19 U/L (ref 0–45)
BILIRUB SERPL-MCNC: 0.8 MG/DL (ref 0.2–1.3)
BUN SERPL-MCNC: 15 MG/DL (ref 7–30)
CALCIUM SERPL-MCNC: 9.1 MG/DL (ref 8.5–10.1)
CHLORIDE SERPL-SCNC: 101 MMOL/L (ref 94–109)
CO2 SERPL-SCNC: 28 MMOL/L (ref 20–32)
CREAT BLD-MCNC: 1.3 MG/DL (ref 0.66–1.25)
CREAT SERPL-MCNC: 1.27 MG/DL (ref 0.66–1.25)
GFR SERPL CREATININE-BSD FRML MDRD: 55 ML/MIN/1.7M2
GFR SERPL CREATININE-BSD FRML MDRD: 56 ML/MIN/1.7M2
GLUCOSE SERPL-MCNC: 98 MG/DL (ref 70–99)
POTASSIUM SERPL-SCNC: 4.5 MMOL/L (ref 3.4–5.3)
PROT SERPL-MCNC: 7.6 G/DL (ref 6.8–8.8)
SODIUM SERPL-SCNC: 134 MMOL/L (ref 133–144)

## 2018-09-06 PROCEDURE — 71260 CT THORAX DX C+: CPT

## 2018-09-06 PROCEDURE — 25000128 H RX IP 250 OP 636: Performed by: RADIOLOGY

## 2018-09-06 PROCEDURE — 36415 COLL VENOUS BLD VENIPUNCTURE: CPT | Performed by: UROLOGY

## 2018-09-06 PROCEDURE — 25000125 ZZHC RX 250: Performed by: RADIOLOGY

## 2018-09-06 PROCEDURE — 80053 COMPREHEN METABOLIC PANEL: CPT | Performed by: UROLOGY

## 2018-09-06 PROCEDURE — 82565 ASSAY OF CREATININE: CPT

## 2018-09-06 RX ADMIN — IOPAMIDOL 90 ML: 755 INJECTION, SOLUTION INTRAVENOUS at 09:49

## 2018-09-06 RX ADMIN — SODIUM CHLORIDE 62 ML: 9 INJECTION, SOLUTION INTRAVENOUS at 09:49

## 2018-09-13 ENCOUNTER — OFFICE VISIT (OUTPATIENT)
Dept: UROLOGY | Facility: CLINIC | Age: 68
End: 2018-09-13
Payer: MEDICARE

## 2018-09-13 VITALS — SYSTOLIC BLOOD PRESSURE: 130 MMHG | DIASTOLIC BLOOD PRESSURE: 84 MMHG | RESPIRATION RATE: 16 BRPM | HEART RATE: 70 BPM

## 2018-09-13 DIAGNOSIS — Z85.528 HISTORY OF KIDNEY CANCER: Primary | ICD-10-CM

## 2018-09-13 PROCEDURE — 99213 OFFICE O/P EST LOW 20 MIN: CPT | Performed by: UROLOGY

## 2018-09-13 NOTE — NURSING NOTE
"Initial /84 (BP Location: Left arm, Patient Position: Chair, Cuff Size: Adult Regular)  Pulse 70  Resp 16 Estimated body mass index is 26.54 kg/(m^2) as calculated from the following:    Height as of 5/24/17: 1.778 m (5' 10\").    Weight as of 5/24/17: 83.9 kg (185 lb). .    marisa soares LPN      "

## 2018-09-13 NOTE — MR AVS SNAPSHOT
After Visit Summary   9/13/2018    Barrera Urena    MRN: [id number]           Patient Information     Date Of Birth          1950        Visit Information        Provider Department      9/13/2018 11:00 AM Tushar Downs MD [facility]        Today's Diagnoses     History of kidney cancer    -  1       Follow-ups after your visit        Your next 10 appointments already scheduled     Apr 10, 2019 10:30 AM CDT   Return Visit with Tushar Downs MD   [facility]    [address] [facility] [address]   [phone]              Who to contact     If you have questions or need follow up information about today's clinic visit or your schedule please contact [facility] directly at [phone].  Normal or non-critical lab and imaging results will be communicated to you by MyChart, letter or phone within 4 business days after the clinic has received the results. If you do not hear from us within 7 days, please contact the clinic through MyChart or phone. If you have a critical or abnormal lab result, we will notify you by phone as soon as possible.  Submit refill requests through [employer] or call your pharmacy and they will forward the refill request to us. Please allow 3 business days for your refill to be completed.          Additional Information About Your Visit        MyChart Information     [employer] gives you secure access to your electronic health record. If you see a primary care provider, you can also send messages to your care team and make appointments. If you have questions, please call your primary care clinic.  If you do not have a primary care provider, please call [phone] and they will assist you.        Care EveryWhere ID     This is your Care EveryWhere ID. This could be used by other organizations to access your [place] medical records  [id number]        Your Vitals Were      Pulse Respirations                70 16           Blood Pressure from Last 3 Encounters:   10/19/18 146/84   09/17/18 144/88   09/13/18 130/84    Weight from Last 3 Encounters:   05/24/17 83.9 kg (185 lb)   12/20/16 82.3 kg (181 lb 8 oz)   12/13/16 81.2 kg (179 lb)               Primary Care Provider Office Phone # Fax Moody Hicks [phone]       [facility] [place] [address]  [place] [address]        Equal Access to Services     [facility]: Hadii aad ku hadasho Soomaali, waaxda luqadaha, qaybta kaalmada adeegyada, waxay daniellein haygaryn adejose angel krishnamurthy . So [facility] [phone].    ATENCIÓN: Si habla español, tiene a musa disposición servicios gratuitos de asistencia lingüística. [facility] [phone].    We comply with applicable federal civil rights laws and [place] laws. We do not discriminate on the basis of race, color, national origin, age, disability, sex, sexual orientation, or gender identity.            Thank you!     Thank you for choosing [facility]  for your care. Our goal is always to provide you with excellent care. Hearing back from our patients is one way we can continue to improve our services. Please take a few minutes to complete the written survey that you may receive in the mail after your visit with us. Thank you!             Your Updated Medication List - Protect others around you: Learn how to safely use, store and throw away your medicines at www.disposemymeds.org.          This list is accurate as of 9/13/18 11:59 PM.  Always use your most recent med list.                   Brand Name Dispense Instructions for use Diagnosis    LIPITOR PO      Take 20 mg by mouth        * TYLENOL 325 MG tablet   Generic drug:  acetaminophen      Take 325 mg by mouth as needed        * acetaminophen 325 MG tablet    TYLENOL    100 tablet    Take 2 tablets (650 mg) by mouth every 6 hours as needed for other (surgical pain)    Renal mass, left       * Notice:  This  list has 2 medication(s) that are the same as other medications prescribed for you. Read the directions carefully, and ask your doctor or other care provider to review them with you.

## 2018-09-13 NOTE — PROGRESS NOTES
Visit Date:   2018      REASON FOR VISIT TODAY:  History of kidney cancer.      BRIEF COURSE:  Mr. Stewart is a 68-year-old gentleman followed in our clinic for a left-sided renal cell carcinoma.  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  He has had no evidence of recurrence to date.  The patient is known to have a couple of 4-5 mm nonspecific pulmonary nodules which are currently being followed.      PHYSICAL EXAMINATION:   VITAL SIGNS:  On exam, his blood pressure is 130/84, pulse is 70.   GENERAL:  He is in no acute distress.      The patient's CMP from 2018 showed a creatinine of 1.27 and normal LFTs.  The patient's CT scan from 2018 shows that the pulmonary lesions in the lung are stable dating back to 2017, therefore are believed to likely be benign.      ASSESSMENT AND PLAN:  Over half of today's 15-minute visit was spent counseling the patient regarding his kidney cancer.  I suggested to Mr. Stewart we were pleased to see that he continues to have no evidence of recurrence and that in particular the pulmonary lesions appear to likely be benign at this point.  We will go back to six-month surveillance intervals at this point then will see the patient back in 6 months with a CT of the chest, abdomen and pelvis and a CMP and go from there.         JOHN STREETER MD             D: 2018   T: 2018   MT: RUSS      Name:     ANETTE STEWART   MRN:      [id number]        Account:      [id number]   :      1950           Visit Date:   2018      Document: [id number]

## 2018-09-17 ENCOUNTER — INFUSION THERAPY VISIT (OUTPATIENT)
Dept: INFUSION THERAPY | Facility: CLINIC | Age: 68
End: 2018-09-17
Attending: ALLERGY & IMMUNOLOGY
Payer: MEDICARE

## 2018-09-17 VITALS
DIASTOLIC BLOOD PRESSURE: 88 MMHG | RESPIRATION RATE: 16 BRPM | HEART RATE: 79 BPM | SYSTOLIC BLOOD PRESSURE: 144 MMHG | TEMPERATURE: 96.2 F

## 2018-09-17 DIAGNOSIS — L50.1 IDIOPATHIC URTICARIA: Primary | ICD-10-CM

## 2018-09-17 PROCEDURE — 96372 THER/PROPH/DIAG INJ SC/IM: CPT

## 2018-09-17 PROCEDURE — 25000128 H RX IP 250 OP 636: Performed by: ALLERGY & IMMUNOLOGY

## 2018-09-17 RX ADMIN — OMALIZUMAB 300 MG: 202.5 INJECTION, SOLUTION SUBCUTANEOUS at 10:22

## 2018-09-17 NOTE — PROGRESS NOTES
Infusion Nursing Note:  Barrera Urena presents today for Xolair.    Patient seen by provider today: No   present during visit today: Not Applicable.    Note: N/A.    Intravenous Access:  No Intravenous access/labs at this visit.    Treatment Conditions:  Lab Results   Component Value Date     09/06/2018                   Lab Results   Component Value Date    POTASSIUM 4.5 09/06/2018           Lab Results   Component Value Date    MAG 1.8 12/20/2016            Lab Results   Component Value Date    CR 1.27 09/06/2018                   Lab Results   Component Value Date    REINA 9.1 09/06/2018                Lab Results   Component Value Date    BILITOTAL 0.8 09/06/2018           Lab Results   Component Value Date    ALBUMIN 3.8 09/06/2018                    Lab Results   Component Value Date    ALT 23 09/06/2018           Lab Results   Component Value Date    AST 19 09/06/2018           Post Infusion Assessment:  Patient tolerated injection without incident.    Discharge Plan:   Patient discharged in stable condition accompanied by: self.  Departure Mode: Ambulatory.    Smita Hickey RN

## 2018-09-17 NOTE — MR AVS SNAPSHOT
After Visit Summary   9/17/2018    Barrera Urena    MRN: [id number]           Patient Information     Date Of Birth          1950        Visit Information        Provider Department      9/17/2018 10:30 AM ROOM 3 [facility] Infusion        Today's Diagnoses     Idiopathic urticaria    -  1       Follow-ups after your visit        Your next 10 appointments already scheduled     Sep 17, 2018 10:30 AM CDT   Level 2 with ROOM 3 [facility] Infusion ([facility])    [facility]  [address]  [facility] [address]   [phone]            Oct 19, 2018 10:00 AM CDT   Level 2 with ROOM 3 [facility] ([facility])    [facility]  [address]  [facility] [address]   [phone]            Apr 10, 2019 10:30 AM CDT   Return Visit with Tushar Downs MD   [facility]    [address] [facility] [address]   [phone]              Who to contact     If you have questions or need follow up information about today's clinic visit or your schedule please contact [facility]ER [facility] directly at [phone].  Normal or non-critical lab and imaging results will be communicated to you by [employer], letter or phone within 4 business days after the clinic has received the results. If you do not hear from us within 7 days, please contact the clinic through [employer] or phone. If you have a critical or abnormal lab result, we will notify you by phone as soon as possible.  Submit refill requests through [employer] or call your pharmacy and they will forward the refill request to us. Please allow 3 business days for your refill to be completed.          Additional Information About Your Visit        [employer] Information     [employer] gives you secure access to your electronic health record. If you see a primary care provider, you can also  send messages to your care team and make appointments. If you have questions, please call your primary care clinic.  If you do not have a primary care provider, please call [phone] and they will assist you.        Care EveryWhere ID     This is your Care EveryWhere ID. This could be used by other organizations to access your [place] medical records  [id number]        Your Vitals Were     Pulse Temperature Respirations             79 96.2  F (35.7  C) (Oral) 16          Blood Pressure from Last 3 Encounters:   09/17/18 144/88   09/13/18 130/84   08/20/18 (!) 148/91    Weight from Last 3 Encounters:   05/24/17 83.9 kg (185 lb)   12/20/16 82.3 kg (181 lb 8 oz)   12/13/16 81.2 kg (179 lb)              Today, you had the following     No orders found for display       Primary Care Provider Office Phone # Fax #    Guillermo Hicks [phone]       [facility] [place] [address]  [facility] [address]        Equal Access to Services     [facility]: Hadii aad ku hadasho Soomaali, waaxda luqadaha, qaybta kaalmada adeegyada, waxay idiin haygaryn deedee krishnamurthy . So [facility] [phone].    ATENCIÓN: Si habla español, tiene a musa disposición servicios gratuitos de asistencia lingüística. [facility] [phone].    We comply with applicable federal civil rights laws and [place] laws. We do not discriminate on the basis of race, color, national origin, age, disability, sex, sexual orientation, or gender identity.            Thank you!     Thank you for choosing [facility]  for your care. Our goal is always to provide you with excellent care. Hearing back from our patients is one way we can continue to improve our services. Please take a few minutes to complete the written survey that you may receive in the mail after your visit with us. Thank you!             Your Updated Medication List - Protect others around you: Learn how to safely use, store and throw away your medicines at  www.disposemymeds.org.          This list is accurate as of 9/17/18 10:25 AM.  Always use your most recent med list.                   Brand Name Dispense Instructions for use Diagnosis    LIPITOR PO      Take 20 mg by mouth        * TYLENOL 325 MG tablet   Generic drug:  acetaminophen      Take 325 mg by mouth as needed        * acetaminophen 325 MG tablet    TYLENOL    100 tablet    Take 2 tablets (650 mg) by mouth every 6 hours as needed for other (surgical pain)    Renal mass, left       * Notice:  This list has 2 medication(s) that are the same as other medications prescribed for you. Read the directions carefully, and ask your doctor or other care provider to review them with you.

## 2018-10-19 ENCOUNTER — INFUSION THERAPY VISIT (OUTPATIENT)
Dept: INFUSION THERAPY | Facility: CLINIC | Age: 68
End: 2018-10-19
Attending: ALLERGY & IMMUNOLOGY
Payer: MEDICARE

## 2018-10-19 VITALS — TEMPERATURE: 98 F | DIASTOLIC BLOOD PRESSURE: 84 MMHG | HEART RATE: 68 BPM | SYSTOLIC BLOOD PRESSURE: 146 MMHG

## 2018-10-19 DIAGNOSIS — L50.1 IDIOPATHIC URTICARIA: Primary | ICD-10-CM

## 2018-10-19 PROCEDURE — 96372 THER/PROPH/DIAG INJ SC/IM: CPT

## 2018-10-19 PROCEDURE — 25000128 H RX IP 250 OP 636: Performed by: ALLERGY & IMMUNOLOGY

## 2018-10-19 RX ADMIN — OMALIZUMAB 300 MG: 202.5 INJECTION, SOLUTION SUBCUTANEOUS at 10:05

## 2018-10-19 NOTE — MR AVS SNAPSHOT
After Visit Summary   10/19/2018    Barrera Urena    MRN: [id number]           Patient Information     Date Of Birth          1950        Visit Information        Provider Department      10/19/2018 10:00 AM ROOM 3 [facility]fusion        Today's Diagnoses     Idiopathic urticaria    -  1       Follow-ups after your visit        Your next 10 appointments already scheduled     Apr 10, 2019 10:30 AM CDT   Return Visit with Tushar Downs MD   [facility]    [address] [facility] [address] [phone]              Who to contact     If you have questions or need follow up information about today's clinic visit or your schedule please contact [facility] CANCER INFUSION directly at [phone].  Normal or non-critical lab and imaging results will be communicated to you by MyChart, letter or phone within 4 business days after the clinic has received the results. If you do not hear from us within 7 days, please contact the clinic through MyChart or phone. If you have a critical or abnormal lab result, we will notify you by phone as soon as possible.  Submit refill requests through [employer] or call your pharmacy and they will forward the refill request to us. Please allow 3 business days for your refill to be completed.          Additional Information About Your Visit        MyChart Information     [employer] gives you secure access to your electronic health record. If you see a primary care provider, you can also send messages to your care team and make appointments. If you have questions, please call your primary care clinic.  If you do not have a primary care provider, please call [phone] and they will assist you.        Care EveryWhere ID     This is your Care EveryWhere ID. This could be used by other organizations to access your [place] medical records  [id number]        Your Vitals Were     Pulse Temperature                 68 98  F (36.7  C) (Oral)           Blood Pressure from Last 3 Encounters:   10/19/18 146/84   09/17/18 144/88   09/13/18 130/84    Weight from Last 3 Encounters:   05/24/17 83.9 kg (185 lb)   12/20/16 82.3 kg (181 lb 8 oz)   12/13/16 81.2 kg (179 lb)              Today, you had the following     No orders found for display       Primary Care Provider Office Phone # Fax Moody Hicks [phone]       [facility] [place] [address]  [place] [address]        Equal Access to Services     : Hadii al khan hadasho Sopilyali, waaxda luqadaha, qaybta kaalmada adejose angelyada, meenu rodrigez adejose angel krishnamurthy . So [facility] [place] [phone].    ATENCIÓN: Si habla español, tiene a musa disposición servicios gratuitos de asistencia lingüística. [facility] [phone].    We comply with applicable federal civil rights laws and [place] laws. We do not discriminate on the basis of race, color, national origin, age, disability, sex, sexual orientation, or gender identity.            Thank you!     Thank you for choosing [facility]  for your care. Our goal is always to provide you with excellent care. Hearing back from our patients is one way we can continue to improve our services. Please take a few minutes to complete the written survey that you may receive in the mail after your visit with us. Thank you!             Your Updated Medication List - Protect others around you: Learn how to safely use, store and throw away your medicines at www.disposemymeds.org.          This list is accurate as of 10/19/18 10:17 AM.  Always use your most recent med list.                   Brand Name Dispense Instructions for use Diagnosis    LIPITOR PO      Take 20 mg by mouth        * TYLENOL 325 MG tablet   Generic drug:  acetaminophen      Take 325 mg by mouth as needed        * acetaminophen 325 MG tablet    TYLENOL    100 tablet    Take 2 tablets (650 mg) by mouth every 6 hours as needed for other  (surgical pain)    Renal mass, left       * Notice:  This list has 2 medication(s) that are the same as other medications prescribed for you. Read the directions carefully, and ask your doctor or other care provider to review them with you.

## 2018-10-19 NOTE — PROGRESS NOTES
Infusion Nursing Note:  Barrera Urena presents today for Xolair injections.    Patient seen by provider today: No   present during visit today: Not Applicable.    Note: N/A.    Intravenous Access:  No Intravenous access/labs at this visit.    Treatment Conditions:  Per therapy plan.      Post Infusion Assessment:  Patient tolerated injections without incident.    Discharge Plan:   Patient discharged in stable condition accompanied by: wife.  Departure Mode: Ambulatory.    Maira Carey RN

## 2018-11-01 ENCOUNTER — MEDICAL CORRESPONDENCE (OUTPATIENT)
Dept: HEALTH INFORMATION MANAGEMENT | Facility: CLINIC | Age: 68
End: 2018-11-01

## 2018-11-30 ENCOUNTER — INFUSION THERAPY VISIT (OUTPATIENT)
Dept: INFUSION THERAPY | Facility: CLINIC | Age: 68
End: 2018-11-30
Attending: ALLERGY & IMMUNOLOGY
Payer: MEDICARE

## 2018-11-30 VITALS — HEART RATE: 79 BPM | TEMPERATURE: 96.6 F | SYSTOLIC BLOOD PRESSURE: 152 MMHG | DIASTOLIC BLOOD PRESSURE: 95 MMHG

## 2018-11-30 DIAGNOSIS — L50.1 IDIOPATHIC URTICARIA: Primary | ICD-10-CM

## 2018-11-30 PROCEDURE — 25000128 H RX IP 250 OP 636: Performed by: ALLERGY & IMMUNOLOGY

## 2018-11-30 PROCEDURE — 96372 THER/PROPH/DIAG INJ SC/IM: CPT

## 2018-11-30 RX ADMIN — OMALIZUMAB 300 MG: 202.5 INJECTION, SOLUTION SUBCUTANEOUS at 10:12

## 2018-11-30 NOTE — PROGRESS NOTES
Infusion Nursing Note:  Barrera Urena presents today for Xolair.    Patient seen by provider today: No   present during visit today: Not Applicable.    Note: N/A.    Intravenous Access:  No Intravenous access/labs at this visit.    Treatment Conditions:  Not Applicable.      Post Infusion Assessment:  Patient tolerated injections without incident.  Pt chose not to remain in clinic for observation.    Discharge Plan:   Patient discharged in stable condition accompanied by: wife.  Departure Mode: Ambulatory.    Maira Carey RN

## 2018-11-30 NOTE — MR AVS SNAPSHOT
After Visit Summary   11/30/2018    Barrera Urena    MRN: [id number]           Patient Information     Date Of Birth          1950        Visit Information        Provider Department      11/30/2018 10:00 AM ROOM 4 [facility] Infusion        Today's Diagnoses     Idiopathic urticaria    -  1       Follow-ups after your visit        Your next 10 appointments already scheduled     Jan 11, 2019 10:00 AM CST   Level 2 with ROOM 7 [facility] Infusion ([facility])    [facility]  [address]  [facility] [address]   [phone]            Mar 08, 2019 10:00 AM CST   Level 2 with ROOM 7 [facility]fusion ([facility])    [facility]  [address]  [facility] [address]   [phone]            Apr 10, 2019 10:30 AM CDT   Return Visit with Tushar Downs MD   [facility]    [address] [facility] [address]   [phone]              Who to contact     If you have questions or need follow up information about today's clinic visit or your schedule please contact [facility] CANCER [facility] directly at [phone].  Normal or non-critical lab and imaging results will be communicated to you by [employer], letter or phone within 4 business days after the clinic has received the results. If you do not hear from us within 7 days, please contact the clinic through [employer] or phone. If you have a critical or abnormal lab result, we will notify you by phone as soon as possible.  Submit refill requests through [employer] or call your pharmacy and they will forward the refill request to us. Please allow 3 business days for your refill to be completed.          Additional Information About Your Visit        [facility] Information     [employer] gives you secure access to your electronic health record. If you see a primary care provider, you can  also send messages to your care team and make appointments. If you have questions, please call your primary care clinic.  If you do not have a primary care provider, please call [phone] and they will assist you.        Care EveryWhere ID     This is your Care EveryWhere ID. This could be used by other organizations to access your [place] medical records  [id number]        Your Vitals Were     Pulse Temperature                79 96.6  F (35.9  C) (Oral)           Blood Pressure from Last 3 Encounters:   11/30/18 (!) 152/95   10/19/18 146/84   09/17/18 144/88    Weight from Last 3 Encounters:   05/24/17 83.9 kg (185 lb)   12/20/16 82.3 kg (181 lb 8 oz)   12/13/16 81.2 kg (179 lb)              Today, you had the following     No orders found for display       Primary Care Provider Office Phone # Fax #    Guillermo Hicks [phone]       [facility] [place] [address]  [facility] [address]        Equal Access to Services     [facility]: Hadii aad ku hadasho Soomaali, waaxda luqadaha, qaybta kaalmada adeegyada, waxay idiin haygaryn deedee krishnamurthy . So [facility] [phone].    ATENCIÓN: Si habla español, tiene a musa disposición servicios gratuitos de asistencia lingüística. [facility] [phone].    We comply with applicable federal civil rights laws and [place] laws. We do not discriminate on the basis of race, color, national origin, age, disability, sex, sexual orientation, or gender identity.            Thank you!     Thank you for choosing [facility]  for your care. Our goal is always to provide you with excellent care. Hearing back from our patients is one way we can continue to improve our services. Please take a few minutes to complete the written survey that you may receive in the mail after your visit with us. Thank you!             Your Updated Medication List - Protect others around you: Learn how to safely use, store and throw away your medicines at www.disposemymeds.org.           This list is accurate as of 11/30/18 11:03 AM.  Always use your most recent med list.                   Brand Name Dispense Instructions for use Diagnosis    aspirin 81 MG tablet    ASA     Take 81 mg by mouth At Bedtime        LIPITOR PO      Take 20 mg by mouth        * TYLENOL 325 MG tablet   Generic drug:  acetaminophen      Take 325 mg by mouth as needed        * acetaminophen 325 MG tablet    TYLENOL    100 tablet    Take 2 tablets (650 mg) by mouth every 6 hours as needed for other (surgical pain)    Renal mass, left       ZANTAC PO      Take 75 mg by mouth daily        * Notice:  This list has 2 medication(s) that are the same as other medications prescribed for you. Read the directions carefully, and ask your doctor or other care provider to review them with you.

## 2019-01-11 ENCOUNTER — INFUSION THERAPY VISIT (OUTPATIENT)
Dept: INFUSION THERAPY | Facility: CLINIC | Age: 69
End: 2019-01-11
Attending: ALLERGY & IMMUNOLOGY
Payer: MEDICARE

## 2019-01-11 VITALS
RESPIRATION RATE: 16 BRPM | TEMPERATURE: 96.7 F | HEART RATE: 95 BPM | DIASTOLIC BLOOD PRESSURE: 111 MMHG | SYSTOLIC BLOOD PRESSURE: 157 MMHG

## 2019-01-11 DIAGNOSIS — L50.1 IDIOPATHIC URTICARIA: Primary | ICD-10-CM

## 2019-01-11 PROCEDURE — 25000128 H RX IP 250 OP 636: Performed by: ALLERGY & IMMUNOLOGY

## 2019-01-11 PROCEDURE — 96372 THER/PROPH/DIAG INJ SC/IM: CPT

## 2019-01-11 RX ADMIN — OMALIZUMAB 300 MG: 150 INJECTION, SOLUTION SUBCUTANEOUS at 09:25

## 2019-01-11 NOTE — PROGRESS NOTES
Infusion Nursing Note:  Barrera Urena presents today for Xolair.    Patient seen by provider today: No   present during visit today: Not Applicable.    Note: B/P noted to be elevated. Pt. Denies HA, blurred vision, CP etc. Pt. Not taking any antihypertensives. Advised pt. To f/u with PMD or ER if condition worsens.    Intravenous Access:  No Intravenous access/labs at this visit.    Treatment Conditions:  Not Applicable.      Post Infusion Assessment:  Patient tolerated injection without incident.  Patient observed for 0 minutes post Xolair despite protocol.    Discharge Plan:   Patient discharged in stable condition accompanied by: wife.  Departure Mode: Ambulatory.    James Markham RN

## 2019-03-08 ENCOUNTER — INFUSION THERAPY VISIT (OUTPATIENT)
Dept: INFUSION THERAPY | Facility: CLINIC | Age: 69
End: 2019-03-08
Attending: ALLERGY & IMMUNOLOGY
Payer: MEDICARE

## 2019-03-08 VITALS
SYSTOLIC BLOOD PRESSURE: 144 MMHG | DIASTOLIC BLOOD PRESSURE: 88 MMHG | TEMPERATURE: 96.2 F | HEART RATE: 60 BPM | RESPIRATION RATE: 18 BRPM

## 2019-03-08 DIAGNOSIS — L50.1 IDIOPATHIC URTICARIA: Primary | ICD-10-CM

## 2019-03-08 PROCEDURE — 96372 THER/PROPH/DIAG INJ SC/IM: CPT

## 2019-03-08 PROCEDURE — 25000128 H RX IP 250 OP 636: Performed by: ALLERGY & IMMUNOLOGY

## 2019-03-08 RX ORDER — LISINOPRIL 10 MG/1
10 TABLET ORAL
COMMUNITY
Start: 2019-02-27

## 2019-03-08 RX ORDER — ATENOLOL 100 MG/1
100 TABLET ORAL
COMMUNITY
Start: 2019-02-08

## 2019-03-08 RX ORDER — CARBIDOPA AND LEVODOPA 25; 100 MG/1; MG/1
1 TABLET ORAL
COMMUNITY
Start: 2019-02-27

## 2019-03-08 RX ADMIN — OMALIZUMAB 300 MG: 150 INJECTION, SOLUTION SUBCUTANEOUS at 09:48

## 2019-03-08 ASSESSMENT — PAIN SCALES - GENERAL: PAINLEVEL: NO PAIN (0)

## 2019-03-08 NOTE — PROGRESS NOTES
Infusion Nursing Note:  Barrera Urena presents today for Xolair.    Patient seen by provider today: No   present during visit today: Not Applicable.    Note: N/A.    Intravenous Access:  No Intravenous access/labs at this visit.    Treatment Conditions:  Not Applicable.    Post Infusion Assessment:  Patient tolerated injection without incident.  Patient declined observation time despite protocol.  Site patent and intact, free from redness, edema or discomfort.  No evidence of extravasations.    Discharge Plan:   Discharge instructions reviewed with: Patient.  Patient and/or family verbalized understanding of discharge instructions and all questions answered.  AVS to patient via [facility].  Patient will return 5/3/2019 for next appointment.   Patient discharged in stable condition accompanied by: self and daughter.  Departure Mode: Ambulatory.    Yaneth Mae RN

## 2019-04-05 ENCOUNTER — HOSPITAL ENCOUNTER (OUTPATIENT)
Dept: CT IMAGING | Facility: CLINIC | Age: 69
Discharge: HOME | End: 2019-04-05
Attending: UROLOGY | Admitting: UROLOGY
Payer: MEDICARE

## 2019-04-05 DIAGNOSIS — Z85.528 HISTORY OF KIDNEY CANCER: ICD-10-CM

## 2019-04-05 LAB
ALBUMIN SERPL-MCNC: 4 G/DL (ref 3.4–5)
ALP SERPL-CCNC: 51 U/L (ref 40–150)
ALT SERPL W P-5'-P-CCNC: 7 U/L (ref 0–70)
ANION GAP SERPL CALCULATED.3IONS-SCNC: 3 MMOL/L (ref 3–14)
AST SERPL W P-5'-P-CCNC: 6 U/L (ref 0–45)
BILIRUB SERPL-MCNC: 1 MG/DL (ref 0.2–1.3)
BUN SERPL-MCNC: 23 MG/DL (ref 7–30)
CALCIUM SERPL-MCNC: 9.6 MG/DL (ref 8.5–10.1)
CHLORIDE SERPL-SCNC: 98 MMOL/L (ref 94–109)
CO2 SERPL-SCNC: 30 MMOL/L (ref 20–32)
CREAT BLD-MCNC: 1.5 MG/DL (ref 0.66–1.25)
CREAT SERPL-MCNC: 1.26 MG/DL (ref 0.66–1.25)
GFR SERPL CREATININE-BSD FRML MDRD: 46 ML/MIN/{1.73_M2}
GFR SERPL CREATININE-BSD FRML MDRD: 57 ML/MIN/{1.73_M2}
GLUCOSE SERPL-MCNC: 103 MG/DL (ref 70–99)
POTASSIUM SERPL-SCNC: 4.7 MMOL/L (ref 3.4–5.3)
PROT SERPL-MCNC: 7.3 G/DL (ref 6.8–8.8)
SODIUM SERPL-SCNC: 131 MMOL/L (ref 133–144)

## 2019-04-05 PROCEDURE — 36415 COLL VENOUS BLD VENIPUNCTURE: CPT | Performed by: UROLOGY

## 2019-04-05 PROCEDURE — 25000125 ZZHC RX 250: Performed by: RADIOLOGY

## 2019-04-05 PROCEDURE — 25000128 H RX IP 250 OP 636: Performed by: RADIOLOGY

## 2019-04-05 PROCEDURE — 80053 COMPREHEN METABOLIC PANEL: CPT | Performed by: UROLOGY

## 2019-04-05 PROCEDURE — 82565 ASSAY OF CREATININE: CPT | Mod: 91

## 2019-04-05 PROCEDURE — 71260 CT THORAX DX C+: CPT

## 2019-04-05 PROCEDURE — 74177 CT ABD & PELVIS W/CONTRAST: CPT

## 2019-04-05 RX ORDER — IOPAMIDOL 755 MG/ML
93 INJECTION, SOLUTION INTRAVASCULAR ONCE
Status: COMPLETED | OUTPATIENT
Start: 2019-04-05 | End: 2019-04-05

## 2019-04-05 RX ADMIN — SODIUM CHLORIDE 63 ML: 9 INJECTION, SOLUTION INTRAVENOUS at 11:13

## 2019-04-05 RX ADMIN — IOPAMIDOL 93 ML: 755 INJECTION, SOLUTION INTRAVENOUS at 11:12

## 2019-04-25 ENCOUNTER — OFFICE VISIT (OUTPATIENT)
Dept: UROLOGY | Facility: CLINIC | Age: 69
End: 2019-04-25
Payer: MEDICARE

## 2019-04-25 VITALS — RESPIRATION RATE: 16 BRPM | SYSTOLIC BLOOD PRESSURE: 124 MMHG | DIASTOLIC BLOOD PRESSURE: 79 MMHG | HEART RATE: 56 BPM

## 2019-04-25 DIAGNOSIS — Z85.528 HISTORY OF KIDNEY CANCER: Primary | ICD-10-CM

## 2019-04-25 PROCEDURE — 99213 OFFICE O/P EST LOW 20 MIN: CPT | Performed by: UROLOGY

## 2019-04-25 RX ORDER — FAMOTIDINE 20 MG
TABLET ORAL
COMMUNITY

## 2019-04-25 NOTE — PROGRESS NOTES
Visit Date:   04/25/2019      REASON FOR VISIT TODAY:  History of kidney cancer.      Mr. Urena is a 69-year-old gentleman followed in our clinic for history of a left-sided renal cell carcinoma.  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  He has had no evidence of recurrence to date.  The patient comes in today noting that since we last saw him, he has been diagnosed with Parkinson's and is currently managing Sinemet.  The patient also notes that he has had some left testicular enlargement for the last 6-12 months.  It is nontender and does not bother him otherwise, but he does note testicle is somewhat enlarged.      PHYSICAL EXAMINATION:   VITAL SIGNS:  On exam, his blood pressure is 124/79, pulse 56.   GENERAL:  He is in no acute distress.   GENITALIA:  On exam, the patient's left testicle is larger than the right.  There are few varicosities noted on the scrotum and in the cord consistent with possible varicocele.  There appears to be perhaps a small amount of fluid also around the testicle.  Palpation of the right testicle reveals an essentially normal exam, though perhaps a small varicocele on that side as well.      The patient's CMP from 04/05/2019 shows normal LFTs, a creatinine of 1.26.  The patient's CT of the chest, abdomen and pelvis from 04/05/2019 shows no evidence of recurrence.      ASSESSMENT AND PLAN:  Over half of today's 15-minute visit was spent counseling the patient regarding his kidney cancer and his testicular enlargement.  I suggested to Mr. Urena we are pleased to see there is no evidence of recurrence of his kidney cancer.  We are at 2-1/2 years from surgery at this time.  We will see the patient back in another 6 months with another CMP and a CT of the chest, abdomen and pelvis, which will get us out to 3 years, at which point we will be able to  switch to annual testing beyond that out to year 5.      With regard to the testicle, I suggested to the patient that this is likely a hydrocele and/or varicocele.  This may be result of ligation of the testicular vein during his nephrectomy, but does not appear to be anything worrisome or concerning for testicular cancer.  We would therefore recommend continued observation at this time.  The patient is in agreement with the plan.  We will see him back in 6 months with a CMP and CT of the chest, abdomen and pelvis.         JOHN STREETER MD             D: 2019   T: 2019   MT: LUISA      Name:     ANETTE STEWART   MRN:      [id number]        Account:      [id number]   :      1950           Visit Date:   2019      Document: [id number]

## 2019-04-25 NOTE — NURSING NOTE
"Initial /79 (BP Location: Left arm, Patient Position: Chair, Cuff Size: Adult Regular)   Pulse 56   Resp 16  Estimated body mass index is 26.54 kg/m  as calculated from the following:    Height as of 5/24/17: 1.778 m (5' 10\").    Weight as of 5/24/17: 83.9 kg (185 lb). .    Patient is here for a recheck  marisa soares LPN    "

## 2019-05-03 ENCOUNTER — INFUSION THERAPY VISIT (OUTPATIENT)
Dept: INFUSION THERAPY | Facility: CLINIC | Age: 69
End: 2019-05-03
Attending: ALLERGY & IMMUNOLOGY
Payer: MEDICARE

## 2019-05-03 VITALS — HEART RATE: 59 BPM | TEMPERATURE: 97.5 F | SYSTOLIC BLOOD PRESSURE: 128 MMHG | DIASTOLIC BLOOD PRESSURE: 75 MMHG

## 2019-05-03 DIAGNOSIS — L50.1 IDIOPATHIC URTICARIA: Primary | ICD-10-CM

## 2019-05-03 PROCEDURE — 96372 THER/PROPH/DIAG INJ SC/IM: CPT

## 2019-05-03 PROCEDURE — 25000128 H RX IP 250 OP 636: Performed by: ALLERGY & IMMUNOLOGY

## 2019-05-03 RX ADMIN — OMALIZUMAB 300 MG: 150 INJECTION, SOLUTION SUBCUTANEOUS at 10:14

## 2019-05-03 NOTE — PROGRESS NOTES
Infusion Nursing Note:  Barrera Urena presents today for Xolair injections.    Patient seen by provider today: No   present during visit today: Not Applicable.    Note: Pt is aware that we have no orders for his medication going forward from today. He had an appt with Dr. Knowles recently. He/wife will make contact with that office to get orders to us.      Treatment Conditions:  Not Applicable.      Post Infusion Assessment:  Patient tolerated injections without incident.  Patient observed for 0 minutes post injections. Pt chooses not to remain for observation.      Discharge Plan:   Patient discharged in stable condition accompanied by: wife.  Departure Mode: Ambulatory.    Maira Carey RN

## 2019-10-17 ENCOUNTER — HOSPITAL ENCOUNTER (OUTPATIENT)
Dept: CT IMAGING | Facility: CLINIC | Age: 69
Discharge: HOME | End: 2019-10-17
Attending: UROLOGY | Admitting: UROLOGY
Payer: MEDICARE

## 2019-10-17 DIAGNOSIS — Z85.528 HISTORY OF KIDNEY CANCER: ICD-10-CM

## 2019-10-17 LAB
ALBUMIN SERPL-MCNC: 3.9 G/DL (ref 3.4–5)
ALP SERPL-CCNC: 58 U/L (ref 40–150)
ALT SERPL W P-5'-P-CCNC: 7 U/L (ref 0–70)
ANION GAP SERPL CALCULATED.3IONS-SCNC: 6 MMOL/L (ref 3–14)
AST SERPL W P-5'-P-CCNC: 8 U/L (ref 0–45)
BILIRUB SERPL-MCNC: 0.7 MG/DL (ref 0.2–1.3)
BUN SERPL-MCNC: 21 MG/DL (ref 7–30)
CALCIUM SERPL-MCNC: 9.9 MG/DL (ref 8.5–10.1)
CHLORIDE SERPL-SCNC: 98 MMOL/L (ref 94–109)
CO2 SERPL-SCNC: 28 MMOL/L (ref 20–32)
CREAT SERPL-MCNC: 1.08 MG/DL (ref 0.66–1.25)
GFR SERPL CREATININE-BSD FRML MDRD: 69 ML/MIN/{1.73_M2}
GLUCOSE SERPL-MCNC: 107 MG/DL (ref 70–99)
POTASSIUM SERPL-SCNC: 4.7 MMOL/L (ref 3.4–5.3)
PROT SERPL-MCNC: 7.5 G/DL (ref 6.8–8.8)
SODIUM SERPL-SCNC: 132 MMOL/L (ref 133–144)

## 2019-10-17 PROCEDURE — 80053 COMPREHEN METABOLIC PANEL: CPT | Performed by: UROLOGY

## 2019-10-17 PROCEDURE — 71260 CT THORAX DX C+: CPT

## 2019-10-17 PROCEDURE — 36415 COLL VENOUS BLD VENIPUNCTURE: CPT | Performed by: UROLOGY

## 2019-10-17 PROCEDURE — 74177 CT ABD & PELVIS W/CONTRAST: CPT

## 2019-10-17 PROCEDURE — 25000128 H RX IP 250 OP 636: Performed by: RADIOLOGY

## 2019-10-17 PROCEDURE — 25000125 ZZHC RX 250: Performed by: RADIOLOGY

## 2019-10-17 RX ORDER — IOPAMIDOL 755 MG/ML
90 INJECTION, SOLUTION INTRAVASCULAR ONCE
Status: COMPLETED | OUTPATIENT
Start: 2019-10-17 | End: 2019-10-17

## 2019-10-17 RX ADMIN — SODIUM CHLORIDE 62 ML: 9 INJECTION, SOLUTION INTRAVENOUS at 10:45

## 2019-10-17 RX ADMIN — IOPAMIDOL 90 ML: 755 INJECTION, SOLUTION INTRAVENOUS at 10:45

## 2019-10-24 ENCOUNTER — OFFICE VISIT (OUTPATIENT)
Dept: UROLOGY | Facility: CLINIC | Age: 69
End: 2019-10-24
Payer: MEDICARE

## 2019-10-24 VITALS — HEART RATE: 60 BPM | DIASTOLIC BLOOD PRESSURE: 71 MMHG | SYSTOLIC BLOOD PRESSURE: 130 MMHG | RESPIRATION RATE: 16 BRPM

## 2019-10-24 DIAGNOSIS — R35.1 NOCTURIA: Primary | ICD-10-CM

## 2019-10-24 DIAGNOSIS — C64.2 MALIGNANT NEOPLASM OF KIDNEY EXCLUDING RENAL PELVIS, LEFT (H): ICD-10-CM

## 2019-10-24 PROCEDURE — 99214 OFFICE O/P EST MOD 30 MIN: CPT | Performed by: UROLOGY

## 2019-10-24 RX ORDER — TAMSULOSIN HYDROCHLORIDE 0.4 MG/1
0.4 CAPSULE ORAL DAILY
Qty: 90 CAPSULE | Refills: 3 | Status: SHIPPED | OUTPATIENT
Start: 2019-10-24 | End: 2020-10-29

## 2019-10-24 NOTE — NURSING NOTE
"Initial /71 (BP Location: Left arm, Patient Position: Chair, Cuff Size: Adult Regular)   Pulse 60   Resp 16  Estimated body mass index is 26.54 kg/m  as calculated from the following:    Height as of 5/24/17: 1.778 m (5' 10\").    Weight as of 5/24/17: 83.9 kg (185 lb). .      "

## 2019-10-25 NOTE — PROGRESS NOTES
Visit Date:   10/24/2019      REASON FOR VISIT TODAY:  Kidney cancer.      BRIEF COURSE:  Mr. Stewart is a 69-year-old gentleman followed in our clinic for history of kidney cancer.  The patient underwent open left radical nephrectomy on 2016 was found to have a Gill grade 3 clear cell renal cell carcinoma, stage pT3a, N0, MX disease with negative margins.  No evidence of recurrence to date.  The patient has been diagnosed with Parkinson's in the last year or so and is on Sinemet.  The patient and his wife note that he has been having nocturia roughly 2 times per night and has been having more fatigue during the day.  The patient and his wife are not sure if the fatigue is due to getting up twice a night or whether it has to do with being on the Sinemet.      PHYSICAL EXAMINATION:   VITAL:  On exam his blood pressure is 130/71, pulse 60.   GENERAL:  He is in no acute distress.      LABORATORY DATA:  The patient's complete metabolic panel from 10/17/2019 shows a creatinine of 1.08 and normal LFTs.        A CT of the chest, abdomen and pelvis from 10/17/2019 shows no evidence of metastatic disease.      ASSESSMENT AND PLAN:  Over half of today's 25-minute visit was spent counseling the patient regarding his kidney cancer and followup.  I suggested to Mr. Stewart that we are pleased to see he is doing well in terms of his cancer control with no evidence of disease.  We will see him back in a year's time for his next followup.  In the meantime, the patient wishes to try Flomax 0.4 mg p.o. daily and was given a prescription for this to see if this improves his nocturia and subsequently if the improvement in his nocturia improves his fatigue.  The patient is in agreement with the plan.         JOHN STREETER MD             D: 10/24/2019   T: 10/24/2019   MT: WT      Name:     ANETTE STEWART   MRN:      [id number]        Account:      [id number]   :      1950           Visit Date:    10/24/2019      Document: [id number]

## 2020-03-10 ENCOUNTER — HEALTH MAINTENANCE LETTER (OUTPATIENT)
Age: 70
End: 2020-03-10

## 2020-07-23 ENCOUNTER — TELEPHONE (OUTPATIENT)
Dept: UROLOGY | Facility: CLINIC | Age: 70
End: 2020-07-23

## 2020-07-23 DIAGNOSIS — C64.2 KIDNEY MALIGNANCY, LEFT (H): Primary | ICD-10-CM

## 2020-07-23 NOTE — TELEPHONE ENCOUNTER
Reason for Call:  Other     Detailed comments: Pt's wife, Asha, calling (CTC on file):  Pt has appt scheduled for 10/29. She needs to know when can she call to get labs and imaging scheduled for this appt (orders need to be placed) - Please contact pt     Phone Number Patient can be reached at: Home number on file [phone] (home)    Best Time: Any    Can we leave a detailed message on this number? YES    Call taken on 7/23/2020 at 2:44 PM by Denise Behrendt

## 2020-10-21 ENCOUNTER — TELEPHONE (OUTPATIENT)
Dept: UROLOGY | Facility: CLINIC | Age: 70
End: 2020-10-21

## 2020-10-21 NOTE — TELEPHONE ENCOUNTER
Reason for Call:  Other appointment    Detailed comments: pt wife Asha calling stating pt has to cancel appt on 10/29 and would like to r/s for another day. Please call to set something up.     Phone Number Patient can be reached at: Home number on file [phone] (home)    Best Time: any     Can we leave a detailed message on this number? YES    Call taken on 10/21/2020 at 11:58 AM by Fiona Rooney

## 2020-10-29 DIAGNOSIS — R35.1 NOCTURIA: ICD-10-CM

## 2020-10-29 RX ORDER — TAMSULOSIN HYDROCHLORIDE 0.4 MG/1
0.4 CAPSULE ORAL DAILY
Qty: 90 CAPSULE | Refills: 0 | Status: SHIPPED | OUTPATIENT
Start: 2020-10-29 | End: 2021-02-01

## 2020-10-29 NOTE — TELEPHONE ENCOUNTER
Requested Prescriptions   Pending Prescriptions Disp Refills     tamsulosin (FLOMAX) 0.4 MG capsule 90 capsule 3     Sig: Take 1 capsule (0.4 mg) by mouth daily       There is no refill protocol information for this order        Last office visit: 10/24/2019 with prescribing provider:  Dr. Downs   Future Office Visit:        Denise Behrendt  Specialty CSS

## 2020-11-17 ENCOUNTER — HOSPITAL ENCOUNTER (OUTPATIENT)
Dept: CT IMAGING | Facility: CLINIC | Age: 70
Discharge: HOME | End: 2020-11-17
Attending: UROLOGY | Admitting: UROLOGY
Payer: MEDICARE

## 2020-11-17 DIAGNOSIS — C64.2 KIDNEY MALIGNANCY, LEFT (H): ICD-10-CM

## 2020-11-17 LAB
ALBUMIN SERPL-MCNC: 3.8 G/DL (ref 3.4–5)
ALP SERPL-CCNC: 56 U/L (ref 40–150)
ALT SERPL W P-5'-P-CCNC: 8 U/L (ref 0–70)
ANION GAP SERPL CALCULATED.3IONS-SCNC: 4 MMOL/L (ref 3–14)
AST SERPL W P-5'-P-CCNC: 10 U/L (ref 0–45)
BILIRUB SERPL-MCNC: 0.6 MG/DL (ref 0.2–1.3)
BUN SERPL-MCNC: 28 MG/DL (ref 7–30)
CALCIUM SERPL-MCNC: 9.7 MG/DL (ref 8.5–10.1)
CHLORIDE SERPL-SCNC: 102 MMOL/L (ref 94–109)
CO2 SERPL-SCNC: 26 MMOL/L (ref 20–32)
CREAT SERPL-MCNC: 1.26 MG/DL (ref 0.66–1.25)
GFR SERPL CREATININE-BSD FRML MDRD: 57 ML/MIN/{1.73_M2}
GLUCOSE SERPL-MCNC: 112 MG/DL (ref 70–99)
POTASSIUM SERPL-SCNC: 5.3 MMOL/L (ref 3.4–5.3)
PROT SERPL-MCNC: 7.2 G/DL (ref 6.8–8.8)
SODIUM SERPL-SCNC: 132 MMOL/L (ref 133–144)

## 2020-11-17 PROCEDURE — 71260 CT THORAX DX C+: CPT

## 2020-11-17 PROCEDURE — 80053 COMPREHEN METABOLIC PANEL: CPT | Performed by: UROLOGY

## 2020-11-17 PROCEDURE — 250N000011 HC RX IP 250 OP 636: Performed by: RADIOLOGY

## 2020-11-17 PROCEDURE — 36415 COLL VENOUS BLD VENIPUNCTURE: CPT | Performed by: UROLOGY

## 2020-11-17 PROCEDURE — 250N000009 HC RX 250: Performed by: RADIOLOGY

## 2020-11-17 RX ORDER — IOPAMIDOL 755 MG/ML
90 INJECTION, SOLUTION INTRAVASCULAR ONCE
Status: COMPLETED | OUTPATIENT
Start: 2020-11-17 | End: 2020-11-17

## 2020-11-17 RX ADMIN — IOPAMIDOL 90 ML: 755 INJECTION, SOLUTION INTRAVENOUS at 10:11

## 2020-11-17 RX ADMIN — SODIUM CHLORIDE 62 ML: 9 INJECTION, SOLUTION INTRAVENOUS at 10:11

## 2020-11-19 ENCOUNTER — VIRTUAL VISIT (OUTPATIENT)
Dept: UROLOGY | Facility: CLINIC | Age: 70
End: 2020-11-19
Payer: MEDICARE

## 2020-11-19 DIAGNOSIS — C64.2 KIDNEY MALIGNANCY, LEFT (H): Primary | ICD-10-CM

## 2020-11-19 DIAGNOSIS — Z85.528 HISTORY OF KIDNEY CANCER: ICD-10-CM

## 2020-11-19 PROCEDURE — 99441 PR PHYSICIAN TELEPHONE EVALUATION 5-10 MIN: CPT | Performed by: UROLOGY

## 2020-11-19 NOTE — PROGRESS NOTES
"Barrera Urena is a 70 year old male who is being evaluated via a billable telephone visit.      The patient has been notified of following:     \"This telephone visit will be conducted via a call between you and your physician/provider. We have found that certain health care needs can be provided without the need for a physical exam.  This service lets us provide the care you need with a short phone conversation.  If a prescription is necessary we can send it directly to your pharmacy.  If lab work is needed we can place an order for that and you can then stop by our lab to have the test done at a later time.    Telephone visits are billed at different rates depending on your insurance coverage. During this emergency period, for some insurers they may be billed the same as an in-person visit.  Please reach out to your insurance provider with any questions.    If during the course of the call the physician/provider feels a telephone visit is not appropriate, you will not be charged for this service.\"    Patient has given verbal consent for Telephone visit?  Yes    What phone number would you like to be contacted at? [phone]    How would you like to obtain your AVS? Mail a copy   ADDITIONAL PROVIDER NOTES:    REASON FOR VISIT TODAY:  Kidney cancer.      BRIEF COURSE:  Mr. Urena is a 70-year-old gentleman followed in our clinic for history of kidney cancer.  The patient underwent open left radical nephrectomy on 12/09/2016 was found to have a Gill grade 3 clear cell renal cell carcinoma, stage pT3a, N0, MX disease with negative margins.  No evidence of recurrence to date.  The patient has been diagnosed with Parkinson's in the last year or so and is on Sinemet.  The patient and his wife note that he has been having nocturia roughly 2 times per night and has been having more fatigue during the day.  The patient and his wife are not sure if the fatigue is due to getting up twice a night or whether it " has to do with being on the Sinemet.      OBJECTIVE:  CMP 11/17/20: creat 1.26, LFTs WNL  CT scan 11/17/20 shows stable pulm nodules and no evidence of recurrence         ASSESSMENT AND PLAN:  Over half of today's 8-minute call was spent counseling the patient regarding his kidney cancer and followup.  I suggested to Mr. Urena that we are pleased to see he is doing well in terms of his cancer control with no evidence of disease.  We will see him back in a year's time for his last scheduled followup.  we will get another CMP and CT scan at that time    Phone call duration: 8 minutes

## 2021-02-01 ENCOUNTER — TELEPHONE (OUTPATIENT)
Dept: UROLOGY | Facility: CLINIC | Age: 71
End: 2021-02-01

## 2021-02-01 DIAGNOSIS — R35.1 NOCTURIA: ICD-10-CM

## 2021-02-01 RX ORDER — TAMSULOSIN HYDROCHLORIDE 0.4 MG/1
0.4 CAPSULE ORAL DAILY
Qty: 90 CAPSULE | Refills: 3 | Status: SHIPPED | OUTPATIENT
Start: 2021-02-01 | End: 2021-02-12

## 2021-02-01 NOTE — TELEPHONE ENCOUNTER
Requested Prescriptions   Pending Prescriptions Disp Refills     tamsulosin (FLOMAX) 0.4 MG capsule 90 capsule 0     Sig: Take 1 capsule (0.4 mg) by mouth daily       There is no refill protocol information for this order      Last Written Prescription Date:    Last Fill Quantity: ,  # refills:    Last office visit: Visit date not found with prescribing provider:     Future Office Visit:

## 2021-02-12 ENCOUNTER — TELEPHONE (OUTPATIENT)
Dept: UROLOGY | Facility: CLINIC | Age: 71
End: 2021-02-12

## 2021-02-12 DIAGNOSIS — R35.1 NOCTURIA: ICD-10-CM

## 2021-02-12 RX ORDER — TAMSULOSIN HYDROCHLORIDE 0.4 MG/1
0.4 CAPSULE ORAL DAILY
Qty: 90 CAPSULE | Refills: 3 | Status: SHIPPED | OUTPATIENT
Start: 2021-02-12 | End: 2021-08-18

## 2021-02-12 NOTE — TELEPHONE ENCOUNTER
Request received from [facility] (Ph: [phone]) for tamsulosin (FLOMAX) 0.4 MG capsule    Denise Behrendt Specialty CSS

## 2021-02-12 NOTE — TELEPHONE ENCOUNTER
Per Pt spouse- they do not want [facility] handling pt Flomax.  Rx sent to [facility] and call placed to [facility] to cancel the rx.    Geena MAYFIELD   Specialty Clinic RN

## 2021-02-12 NOTE — TELEPHONE ENCOUNTER
Verbal Rx given to the [facility] and [facility] is notified to cancel. The patient has picked up the first fill from [facility]. I left a message for Jeremias that we changed his Rx to [facility] and canceled the [facility]. Raine SHAVER Rn

## 2021-04-24 ENCOUNTER — HEALTH MAINTENANCE LETTER (OUTPATIENT)
Age: 71
End: 2021-04-24

## 2021-08-17 DIAGNOSIS — R35.1 NOCTURIA: ICD-10-CM

## 2021-08-18 RX ORDER — TAMSULOSIN HYDROCHLORIDE 0.4 MG/1
0.4 CAPSULE ORAL DAILY
Qty: 90 CAPSULE | Refills: 1 | Status: SHIPPED | OUTPATIENT
Start: 2021-08-18

## 2021-10-09 ENCOUNTER — HEALTH MAINTENANCE LETTER (OUTPATIENT)
Age: 71
End: 2021-10-09

## 2021-11-10 ENCOUNTER — HOSPITAL ENCOUNTER (OUTPATIENT)
Dept: CT IMAGING | Facility: CLINIC | Age: 71
Discharge: HOME | End: 2021-11-10
Attending: UROLOGY | Admitting: UROLOGY
Payer: MEDICARE

## 2021-11-10 ENCOUNTER — LAB (OUTPATIENT)
Dept: LAB | Facility: CLINIC | Age: 71
End: 2021-11-10
Payer: MEDICARE

## 2021-11-10 DIAGNOSIS — Z85.528 HISTORY OF KIDNEY CANCER: ICD-10-CM

## 2021-11-10 LAB
ALBUMIN SERPL-MCNC: 3.2 G/DL (ref 3.4–5)
ALP SERPL-CCNC: 63 U/L (ref 40–150)
ALT SERPL W P-5'-P-CCNC: 8 U/L (ref 0–70)
ANION GAP SERPL CALCULATED.3IONS-SCNC: 8 MMOL/L (ref 3–14)
AST SERPL W P-5'-P-CCNC: 9 U/L (ref 0–45)
BILIRUB SERPL-MCNC: 0.8 MG/DL (ref 0.2–1.3)
BUN SERPL-MCNC: 20 MG/DL (ref 7–30)
CALCIUM SERPL-MCNC: 9.9 MG/DL (ref 8.5–10.1)
CHLORIDE BLD-SCNC: 103 MMOL/L (ref 94–109)
CO2 SERPL-SCNC: 25 MMOL/L (ref 20–32)
CREAT SERPL-MCNC: 0.98 MG/DL (ref 0.66–1.25)
GFR SERPL CREATININE-BSD FRML MDRD: 77 ML/MIN/1.73M2
GLUCOSE BLD-MCNC: 97 MG/DL (ref 70–99)
POTASSIUM BLD-SCNC: 4.6 MMOL/L (ref 3.4–5.3)
PROT SERPL-MCNC: 7.5 G/DL (ref 6.8–8.8)
SODIUM SERPL-SCNC: 136 MMOL/L (ref 133–144)

## 2021-11-10 PROCEDURE — 71260 CT THORAX DX C+: CPT | Mod: MG

## 2021-11-10 PROCEDURE — 80053 COMPREHEN METABOLIC PANEL: CPT

## 2021-11-10 PROCEDURE — 250N000009 HC RX 250: Performed by: RADIOLOGY

## 2021-11-10 PROCEDURE — 250N000011 HC RX IP 250 OP 636: Performed by: RADIOLOGY

## 2021-11-10 PROCEDURE — 36415 COLL VENOUS BLD VENIPUNCTURE: CPT

## 2021-11-10 RX ORDER — IOPAMIDOL 755 MG/ML
90 INJECTION, SOLUTION INTRAVASCULAR ONCE
Status: COMPLETED | OUTPATIENT
Start: 2021-11-10 | End: 2021-11-10

## 2021-11-10 RX ADMIN — IOPAMIDOL 90 ML: 755 INJECTION, SOLUTION INTRAVENOUS at 12:36

## 2021-11-10 RX ADMIN — SODIUM CHLORIDE 62 ML: 9 INJECTION, SOLUTION INTRAVENOUS at 12:36

## 2021-12-09 ENCOUNTER — VIRTUAL VISIT (OUTPATIENT)
Dept: UROLOGY | Facility: CLINIC | Age: 71
End: 2021-12-09
Payer: MEDICARE

## 2021-12-09 DIAGNOSIS — N40.1 BENIGN LOCALIZED PROSTATIC HYPERPLASIA WITH LOWER URINARY TRACT SYMPTOMS (LUTS): ICD-10-CM

## 2021-12-09 DIAGNOSIS — R35.1 NOCTURIA: Primary | ICD-10-CM

## 2021-12-09 DIAGNOSIS — C64.2 KIDNEY MALIGNANCY, LEFT (H): ICD-10-CM

## 2021-12-09 PROCEDURE — 99441 PR PHYSICIAN TELEPHONE EVALUATION 5-10 MIN: CPT | Performed by: UROLOGY

## 2021-12-09 RX ORDER — TAMSULOSIN HYDROCHLORIDE 0.4 MG/1
0.4 CAPSULE ORAL DAILY
Qty: 90 CAPSULE | Refills: 3 | Status: SHIPPED | OUTPATIENT
Start: 2021-12-09

## 2021-12-09 NOTE — PROGRESS NOTES
Jeremias is a 71 year old who is being evaluated via a billable telephone visit.      What phone number would you like to be contacted at?   How would you like to obtain your AVS?         Subjective   Jeremias is a 71 year old who presents for the following health issues: kidney cancer history    HPI     REASON FOR VISIT TODAY:  Kidney cancer.      BRIEF COURSE:  Mr. Urena is a 71-year-old gentleman followed in our clinic for history of kidney cancer.  The patient underwent open left radical nephrectomy on 12/09/2016 was found to have a Gill grade 3 clear cell renal cell carcinoma, stage pT3a, N0, MX disease with negative margins.  No evidence of recurrence to date.  The patient has been diagnosed with Parkinson's in the last year or so and is on Sinemet.  The patient and his wife note that he has been having nocturia roughly 2 times per night and has been having more fatigue during the day.  The patient and his wife are not sure if the fatigue is due to getting up twice a night or whether it has to do with being on the Sinemet.      OBJECTIVE:  CMP 11/10/21: creat 0.98, LFTs WNL  CT scan 11/10/21 shows stable pulm nodules and no evidence of recurrence         ASSESSMENT AND PLAN:  Over half of today's 8-minute call was spent counseling the patient regarding his kidney cancer and followup.  I suggested to Mr. Urena that we are pleased to see he is doing well in terms of his cancer control with no evidence of disease.  However, we will follow up on the lung findings and check a CT in another 3 months for stability.   If further surveillance is required may refer back to his primary for continued monitoring.             Phone call duration: 8 minutes

## 2022-01-01 ENCOUNTER — HEALTH MAINTENANCE LETTER (OUTPATIENT)
Age: 72
End: 2022-01-01

## 2022-03-18 ENCOUNTER — TELEPHONE (OUTPATIENT)
Dept: UROLOGY | Facility: CLINIC | Age: 72
End: 2022-03-18
Payer: MEDICARE

## 2022-03-18 NOTE — TELEPHONE ENCOUNTER
Reason for Call:  Other appointment    Detailed comments: Pt's wife calling asking if patient would be able to get CT scan (scheduled at [place] 03/28/[place]) done at [facility] in [place].  Pt's wife just got out of hospital and would prefer not to travel far.  Please advise    Phone Number Patient can be reached at: Home number on file [phone] (home)    Best Time: any    Can we leave a detailed message on this number? YES    Call taken on 3/18/2022 at 1:56 PM by Prabhakar Gomez

## 2022-03-18 NOTE — TELEPHONE ENCOUNTER
RN called wife and did discuss that there may be a delay in getting the results back and available for follow appt to review them 10 days later.  Inquired if any way they can find help to drive him.  Spouse just stated have Kayleigh call me tues.    Geena MAYFIELD   Specialty Clinic RN

## 2022-03-22 NOTE — TELEPHONE ENCOUNTER
I wanted to keep everything at [facility]. I just changed the appt  For 3 weeks out.  marisa soares LPN

## 2022-03-29 NOTE — TELEPHONE ENCOUNTER
I called barrera to let him know that Dr Downs is happy to hear he is gaining some weight. Barrera said that he just got back from the store with a bunch of Boost. I told him to keep up the good work. Karlo   
I spoke to Barrera today. He is S/P open radical nephrectomy on 12-19-17. Dr Downs was concerned at Barrera's last clinic visit, that Barrera had lost 15 pound and had a decreased appetite. Dr Downs had suggested that he eat small meals throughout the day and consume some over the counter ensure or Boost for additional protein and help with healing. Barrera was to call 2 weeks and report how he was doing. Barrera has gained 5 pounds. He is eating small meals throughout the day and has one Boost each day. He says that he does have an apatite. He is still a little sore on the left side when he wakes up in the morning. I let him know that it is not unusual to have some discomfort over the next 3 months or so. I let him know that I would FYI Dr Downs regarding his update, and we would call Barrera back if Dr Downs has any other recommendations. Raine SHAVER RN  
Reason for Call:  Other FYI    Detailed comments: pt was seen two weeks ago, dr was concerned about his weight loss. He was to call in and let  know if he has gained weight, has gained 5 pounds back in the last two week.   Still has some pain in left side since 12/19 when he had his operation.     Phone Number Patient can be reached at: Home number on file [phone] (home)    Best Time: any     Can we leave a detailed message on this number? YES    Call taken on 2/8/2017 at 10:10 AM by Fiona Rooney     
Detail Level: Zone
Topical Steroids Counseling: I discussed with the patient that prolonged use of topical steroids can result in the increased appearance of superficial blood vessels (telangiectasias), lightening (hypopigmentation) and thinning of the skin (atrophy).  Patient understands to avoid using high potency steroids in skin folds, the groin or the face.  The patient verbalized understanding of the proper use and possible adverse effects of topical steroids.  All of the patient's questions and concerns were addressed.

## 2022-04-11 ENCOUNTER — HOSPITAL ENCOUNTER (OUTPATIENT)
Dept: CT IMAGING | Facility: CLINIC | Age: 72
Discharge: HOME | End: 2022-04-11
Attending: UROLOGY | Admitting: UROLOGY
Payer: MEDICARE

## 2022-04-11 DIAGNOSIS — C64.2 KIDNEY MALIGNANCY, LEFT (H): ICD-10-CM

## 2022-04-11 PROCEDURE — 71250 CT THORAX DX C-: CPT

## 2022-04-20 ENCOUNTER — TELEPHONE (OUTPATIENT)
Dept: UROLOGY | Facility: CLINIC | Age: 72
End: 2022-04-20
Payer: MEDICARE

## 2022-04-20 NOTE — TELEPHONE ENCOUNTER
I spoke to Jeremias, Per Dr Downs ct of chest does not need any further follow up,  As far as his kidney cancer he has completed his 5 year follow up , and no further treatment is needed, per Dr Downs.  marisa soares LPN

## 2022-05-16 ENCOUNTER — HEALTH MAINTENANCE LETTER (OUTPATIENT)
Age: 72
End: 2022-05-16

## 2023-01-01 ENCOUNTER — HEALTH MAINTENANCE LETTER (OUTPATIENT)
Age: 73
End: 2023-01-01